# Patient Record
Sex: FEMALE | Race: WHITE | URBAN - METROPOLITAN AREA
[De-identification: names, ages, dates, MRNs, and addresses within clinical notes are randomized per-mention and may not be internally consistent; named-entity substitution may affect disease eponyms.]

---

## 2017-06-02 ENCOUNTER — TRANSFERRED RECORDS (OUTPATIENT)
Dept: HEALTH INFORMATION MANAGEMENT | Facility: CLINIC | Age: 52
End: 2017-06-02

## 2017-06-06 ENCOUNTER — ANESTHESIA EVENT (OUTPATIENT)
Dept: SURGERY | Facility: CLINIC | Age: 52
DRG: 166 | End: 2017-06-06
Payer: COMMERCIAL

## 2017-06-06 ENCOUNTER — ANESTHESIA (OUTPATIENT)
Dept: SURGERY | Facility: CLINIC | Age: 52
DRG: 166 | End: 2017-06-06
Payer: COMMERCIAL

## 2017-06-06 ENCOUNTER — HOSPITAL ENCOUNTER (INPATIENT)
Facility: CLINIC | Age: 52
LOS: 2 days | Discharge: HOME | DRG: 166 | End: 2017-06-09
Attending: THORACIC SURGERY (CARDIOTHORACIC VASCULAR SURGERY) | Admitting: THORACIC SURGERY (CARDIOTHORACIC VASCULAR SURGERY)
Payer: COMMERCIAL

## 2017-06-06 ENCOUNTER — APPOINTMENT (OUTPATIENT)
Dept: GENERAL RADIOLOGY | Facility: CLINIC | Age: 52
DRG: 166 | End: 2017-06-06
Attending: THORACIC SURGERY (CARDIOTHORACIC VASCULAR SURGERY)
Payer: COMMERCIAL

## 2017-06-06 DIAGNOSIS — L08.9 SOFT TISSUE INFECTION: Primary | ICD-10-CM

## 2017-06-06 DIAGNOSIS — Z79.4 TYPE 2 DIABETES MELLITUS WITHOUT COMPLICATION, WITH LONG-TERM CURRENT USE OF INSULIN (H): ICD-10-CM

## 2017-06-06 DIAGNOSIS — E11.9 TYPE 2 DIABETES MELLITUS WITHOUT COMPLICATION, WITH LONG-TERM CURRENT USE OF INSULIN (H): ICD-10-CM

## 2017-06-06 LAB
ABO + RH BLD: NORMAL
ABO + RH BLD: NORMAL
ANION GAP SERPL CALCULATED.3IONS-SCNC: 8 MMOL/L (ref 3–14)
BACTERIA SPEC CULT: NORMAL
BASE DEFICIT BLDA-SCNC: 5.2 MMOL/L
BLD GP AB SCN SERPL QL: NORMAL
BLOOD BANK CMNT PATIENT-IMP: NORMAL
BUN SERPL-MCNC: 22 MG/DL (ref 7–30)
CA-I BLD-MCNC: 4.3 MG/DL (ref 4.4–5.2)
CALCIUM SERPL-MCNC: 7.8 MG/DL (ref 8.5–10.1)
CHLORIDE SERPL-SCNC: 110 MMOL/L (ref 94–109)
CO2 SERPL-SCNC: 24 MMOL/L (ref 20–32)
CREAT SERPL-MCNC: 0.84 MG/DL (ref 0.52–1.04)
ERYTHROCYTE [DISTWIDTH] IN BLOOD BY AUTOMATED COUNT: 15.2 % (ref 10–15)
GFR SERPL CREATININE-BSD FRML MDRD: 70 ML/MIN/1.7M2
GLUCOSE BLD-MCNC: 192 MG/DL (ref 70–99)
GLUCOSE SERPL-MCNC: 184 MG/DL (ref 70–99)
HCO3 BLD-SCNC: 20 MMOL/L (ref 21–28)
HCT VFR BLD AUTO: 30.3 % (ref 35–47)
HGB BLD-MCNC: 9.6 G/DL (ref 11.7–15.7)
HGB BLD-MCNC: 9.9 G/DL (ref 11.7–15.7)
INR PPP: 1.29 (ref 0.86–1.14)
MAGNESIUM SERPL-MCNC: 2 MG/DL (ref 1.6–2.3)
MCH RBC QN AUTO: 27.7 PG (ref 26.5–33)
MCHC RBC AUTO-ENTMCNC: 32.7 G/DL (ref 31.5–36.5)
MCV RBC AUTO: 85 FL (ref 78–100)
MICRO REPORT STATUS: NORMAL
MRSA DNA SPEC QL NAA+PROBE: NORMAL
O2/TOTAL GAS SETTING VFR VENT: ABNORMAL %
PCO2 BLD: 40 MM HG (ref 35–45)
PH BLD: 7.32 PH (ref 7.35–7.45)
PHOSPHATE SERPL-MCNC: 3 MG/DL (ref 2.5–4.5)
PLATELET # BLD AUTO: 240 10E9/L (ref 150–450)
PO2 BLD: 91 MM HG (ref 80–105)
POTASSIUM BLD-SCNC: 3.9 MMOL/L (ref 3.4–5.3)
POTASSIUM SERPL-SCNC: 4.2 MMOL/L (ref 3.4–5.3)
RBC # BLD AUTO: 3.57 10E12/L (ref 3.8–5.2)
SODIUM BLD-SCNC: 140 MMOL/L (ref 133–144)
SODIUM SERPL-SCNC: 142 MMOL/L (ref 133–144)
SPECIMEN EXP DATE BLD: NORMAL
SPECIMEN SOURCE: NORMAL
SPECIMEN SOURCE: NORMAL
WBC # BLD AUTO: 20.5 10E9/L (ref 4–11)

## 2017-06-06 PROCEDURE — 84295 ASSAY OF SERUM SODIUM: CPT | Performed by: THORACIC SURGERY (CARDIOTHORACIC VASCULAR SURGERY)

## 2017-06-06 PROCEDURE — 85027 COMPLETE CBC AUTOMATED: CPT | Performed by: SURGERY

## 2017-06-06 PROCEDURE — C9399 UNCLASSIFIED DRUGS OR BIOLOG: HCPCS | Performed by: ANESTHESIOLOGY

## 2017-06-06 PROCEDURE — 82803 BLOOD GASES ANY COMBINATION: CPT | Performed by: THORACIC SURGERY (CARDIOTHORACIC VASCULAR SURGERY)

## 2017-06-06 PROCEDURE — 87640 STAPH A DNA AMP PROBE: CPT | Performed by: INTERNAL MEDICINE

## 2017-06-06 PROCEDURE — 0W9C00Z DRAINAGE OF MEDIASTINUM WITH DRAINAGE DEVICE, OPEN APPROACH: ICD-10-PCS | Performed by: THORACIC SURGERY (CARDIOTHORACIC VASCULAR SURGERY)

## 2017-06-06 PROCEDURE — 87070 CULTURE OTHR SPECIMN AEROBIC: CPT | Performed by: THORACIC SURGERY (CARDIOTHORACIC VASCULAR SURGERY)

## 2017-06-06 PROCEDURE — 82330 ASSAY OF CALCIUM: CPT | Performed by: THORACIC SURGERY (CARDIOTHORACIC VASCULAR SURGERY)

## 2017-06-06 PROCEDURE — 85610 PROTHROMBIN TIME: CPT | Performed by: SURGERY

## 2017-06-06 PROCEDURE — 37000009 ZZH ANESTHESIA TECHNICAL FEE, EACH ADDTL 15 MIN: Performed by: THORACIC SURGERY (CARDIOTHORACIC VASCULAR SURGERY)

## 2017-06-06 PROCEDURE — 0BJ08ZZ INSPECTION OF TRACHEOBRONCHIAL TREE, VIA NATURAL OR ARTIFICIAL OPENING ENDOSCOPIC: ICD-10-PCS | Performed by: THORACIC SURGERY (CARDIOTHORACIC VASCULAR SURGERY)

## 2017-06-06 PROCEDURE — 71000016 ZZH RECOVERY PHASE 1 LEVEL 3 FIRST HR: Performed by: THORACIC SURGERY (CARDIOTHORACIC VASCULAR SURGERY)

## 2017-06-06 PROCEDURE — 86850 RBC ANTIBODY SCREEN: CPT | Performed by: SURGERY

## 2017-06-06 PROCEDURE — 93010 ELECTROCARDIOGRAM REPORT: CPT | Performed by: INTERNAL MEDICINE

## 2017-06-06 PROCEDURE — 25000125 ZZHC RX 250: Performed by: ANESTHESIOLOGY

## 2017-06-06 PROCEDURE — 36000062 ZZH SURGERY LEVEL 4 1ST 30 MIN - UMMC: Performed by: THORACIC SURGERY (CARDIOTHORACIC VASCULAR SURGERY)

## 2017-06-06 PROCEDURE — 87102 FUNGUS ISOLATION CULTURE: CPT | Performed by: THORACIC SURGERY (CARDIOTHORACIC VASCULAR SURGERY)

## 2017-06-06 PROCEDURE — 36000064 ZZH SURGERY LEVEL 4 EA 15 ADDTL MIN - UMMC: Performed by: THORACIC SURGERY (CARDIOTHORACIC VASCULAR SURGERY)

## 2017-06-06 PROCEDURE — 87641 MR-STAPH DNA AMP PROBE: CPT | Performed by: INTERNAL MEDICINE

## 2017-06-06 PROCEDURE — 80048 BASIC METABOLIC PNL TOTAL CA: CPT | Performed by: SURGERY

## 2017-06-06 PROCEDURE — 27210794 ZZH OR GENERAL SUPPLY STERILE: Performed by: THORACIC SURGERY (CARDIOTHORACIC VASCULAR SURGERY)

## 2017-06-06 PROCEDURE — 83735 ASSAY OF MAGNESIUM: CPT | Performed by: SURGERY

## 2017-06-06 PROCEDURE — 84100 ASSAY OF PHOSPHORUS: CPT | Performed by: SURGERY

## 2017-06-06 PROCEDURE — 25000128 H RX IP 250 OP 636: Performed by: ANESTHESIOLOGY

## 2017-06-06 PROCEDURE — 87077 CULTURE AEROBIC IDENTIFY: CPT | Performed by: THORACIC SURGERY (CARDIOTHORACIC VASCULAR SURGERY)

## 2017-06-06 PROCEDURE — 37000008 ZZH ANESTHESIA TECHNICAL FEE, 1ST 30 MIN: Performed by: THORACIC SURGERY (CARDIOTHORACIC VASCULAR SURGERY)

## 2017-06-06 PROCEDURE — 87186 SC STD MICRODIL/AGAR DIL: CPT | Performed by: THORACIC SURGERY (CARDIOTHORACIC VASCULAR SURGERY)

## 2017-06-06 PROCEDURE — 86901 BLOOD TYPING SEROLOGIC RH(D): CPT | Performed by: SURGERY

## 2017-06-06 PROCEDURE — 84132 ASSAY OF SERUM POTASSIUM: CPT | Performed by: THORACIC SURGERY (CARDIOTHORACIC VASCULAR SURGERY)

## 2017-06-06 PROCEDURE — 40000065 ZZH STATISTIC EKG NON-CHARGEABLE

## 2017-06-06 PROCEDURE — 71000017 ZZH RECOVERY PHASE 1 LEVEL 3 EA ADDTL HR: Performed by: THORACIC SURGERY (CARDIOTHORACIC VASCULAR SURGERY)

## 2017-06-06 PROCEDURE — 71010 XR CHEST PORT 1 VW: CPT

## 2017-06-06 PROCEDURE — 82947 ASSAY GLUCOSE BLOOD QUANT: CPT | Performed by: THORACIC SURGERY (CARDIOTHORACIC VASCULAR SURGERY)

## 2017-06-06 PROCEDURE — 86900 BLOOD TYPING SEROLOGIC ABO: CPT | Performed by: SURGERY

## 2017-06-06 PROCEDURE — 25000566 ZZH SEVOFLURANE, EA 15 MIN: Performed by: THORACIC SURGERY (CARDIOTHORACIC VASCULAR SURGERY)

## 2017-06-06 RX ORDER — ONDANSETRON 2 MG/ML
INJECTION INTRAMUSCULAR; INTRAVENOUS PRN
Status: DISCONTINUED | OUTPATIENT
Start: 2017-06-06 | End: 2017-06-07

## 2017-06-06 RX ORDER — FENTANYL CITRATE 50 UG/ML
INJECTION, SOLUTION INTRAMUSCULAR; INTRAVENOUS PRN
Status: DISCONTINUED | OUTPATIENT
Start: 2017-06-06 | End: 2017-06-07

## 2017-06-06 RX ORDER — PROPOFOL 10 MG/ML
INJECTION, EMULSION INTRAVENOUS PRN
Status: DISCONTINUED | OUTPATIENT
Start: 2017-06-06 | End: 2017-06-07

## 2017-06-06 RX ORDER — CEFAZOLIN SODIUM 1 G/3ML
1 INJECTION, POWDER, FOR SOLUTION INTRAMUSCULAR; INTRAVENOUS SEE ADMIN INSTRUCTIONS
Status: DISCONTINUED | OUTPATIENT
Start: 2017-06-06 | End: 2017-06-07 | Stop reason: HOSPADM

## 2017-06-06 RX ORDER — LIDOCAINE HYDROCHLORIDE 20 MG/ML
INJECTION, SOLUTION INFILTRATION; PERINEURAL PRN
Status: DISCONTINUED | OUTPATIENT
Start: 2017-06-06 | End: 2017-06-07

## 2017-06-06 RX ORDER — CEFAZOLIN SODIUM 2 G/100ML
2 INJECTION, SOLUTION INTRAVENOUS
Status: DISCONTINUED | OUTPATIENT
Start: 2017-06-06 | End: 2017-06-07 | Stop reason: HOSPADM

## 2017-06-06 RX ORDER — SODIUM CHLORIDE, SODIUM LACTATE, POTASSIUM CHLORIDE, CALCIUM CHLORIDE 600; 310; 30; 20 MG/100ML; MG/100ML; MG/100ML; MG/100ML
INJECTION, SOLUTION INTRAVENOUS CONTINUOUS PRN
Status: DISCONTINUED | OUTPATIENT
Start: 2017-06-06 | End: 2017-06-07

## 2017-06-06 RX ORDER — ESMOLOL HYDROCHLORIDE 10 MG/ML
INJECTION INTRAVENOUS PRN
Status: DISCONTINUED | OUTPATIENT
Start: 2017-06-06 | End: 2017-06-07

## 2017-06-06 RX ORDER — DEXAMETHASONE SODIUM PHOSPHATE 4 MG/ML
INJECTION, SOLUTION INTRA-ARTICULAR; INTRALESIONAL; INTRAMUSCULAR; INTRAVENOUS; SOFT TISSUE PRN
Status: DISCONTINUED | OUTPATIENT
Start: 2017-06-06 | End: 2017-06-07

## 2017-06-06 RX ORDER — PROPOFOL 10 MG/ML
INJECTION, EMULSION INTRAVENOUS CONTINUOUS PRN
Status: DISCONTINUED | OUTPATIENT
Start: 2017-06-06 | End: 2017-06-07

## 2017-06-06 RX ORDER — EPHEDRINE SULFATE 50 MG/ML
INJECTION, SOLUTION INTRAMUSCULAR; INTRAVENOUS; SUBCUTANEOUS PRN
Status: DISCONTINUED | OUTPATIENT
Start: 2017-06-06 | End: 2017-06-07

## 2017-06-06 RX ADMIN — ROCURONIUM BROMIDE 20 MG: 10 INJECTION INTRAVENOUS at 22:32

## 2017-06-06 RX ADMIN — FENTANYL CITRATE 50 MCG: 50 INJECTION, SOLUTION INTRAMUSCULAR; INTRAVENOUS at 22:54

## 2017-06-06 RX ADMIN — SODIUM CHLORIDE, POTASSIUM CHLORIDE, SODIUM LACTATE AND CALCIUM CHLORIDE: 600; 310; 30; 20 INJECTION, SOLUTION INTRAVENOUS at 21:37

## 2017-06-06 RX ADMIN — ESMOLOL HYDROCHLORIDE 20 MG: 10 INJECTION, SOLUTION INTRAVENOUS at 23:36

## 2017-06-06 RX ADMIN — FENTANYL CITRATE 100 MCG: 50 INJECTION, SOLUTION INTRAMUSCULAR; INTRAVENOUS at 21:50

## 2017-06-06 RX ADMIN — PROPOFOL 150 MG: 10 INJECTION, EMULSION INTRAVENOUS at 21:50

## 2017-06-06 RX ADMIN — ONDANSETRON 4 MG: 2 INJECTION INTRAMUSCULAR; INTRAVENOUS at 23:27

## 2017-06-06 RX ADMIN — SUGAMMADEX 200 MG: 100 INJECTION, SOLUTION INTRAVENOUS at 23:26

## 2017-06-06 RX ADMIN — LIDOCAINE HYDROCHLORIDE 100 MG: 20 INJECTION, SOLUTION INFILTRATION; PERINEURAL at 21:50

## 2017-06-06 RX ADMIN — SUCCINYLCHOLINE CHLORIDE 100 MG: 20 INJECTION, SOLUTION INTRAMUSCULAR; INTRAVENOUS at 21:50

## 2017-06-06 RX ADMIN — PHENYLEPHRINE HYDROCHLORIDE 200 MCG: 10 INJECTION, SOLUTION INTRAMUSCULAR; INTRAVENOUS; SUBCUTANEOUS at 21:59

## 2017-06-06 RX ADMIN — PROPOFOL 100 MCG/KG/MIN: 10 INJECTION, EMULSION INTRAVENOUS at 22:00

## 2017-06-06 RX ADMIN — Medication 10 MG: at 22:03

## 2017-06-06 RX ADMIN — PHENYLEPHRINE HYDROCHLORIDE 100 MCG: 10 INJECTION, SOLUTION INTRAMUSCULAR; INTRAVENOUS; SUBCUTANEOUS at 21:53

## 2017-06-06 RX ADMIN — FENTANYL CITRATE 50 MCG: 50 INJECTION, SOLUTION INTRAMUSCULAR; INTRAVENOUS at 22:33

## 2017-06-06 RX ADMIN — ROCURONIUM BROMIDE 50 MG: 10 INJECTION INTRAVENOUS at 22:11

## 2017-06-06 RX ADMIN — FENTANYL CITRATE 50 MCG: 50 INJECTION, SOLUTION INTRAMUSCULAR; INTRAVENOUS at 23:08

## 2017-06-06 RX ADMIN — ESMOLOL HYDROCHLORIDE 20 MG: 10 INJECTION, SOLUTION INTRAVENOUS at 23:38

## 2017-06-06 RX ADMIN — ESMOLOL HYDROCHLORIDE 20 MG: 10 INJECTION, SOLUTION INTRAVENOUS at 22:38

## 2017-06-06 RX ADMIN — DEXAMETHASONE SODIUM PHOSPHATE 10 MG: 4 INJECTION, SOLUTION INTRA-ARTICULAR; INTRALESIONAL; INTRAMUSCULAR; INTRAVENOUS; SOFT TISSUE at 22:41

## 2017-06-06 NOTE — IP AVS SNAPSHOT
[address] [address]    Phone:  [phone]                                       After Visit Summary   6/6/2017    Otilia Mckee    MRN: [id number]           After Visit Summary Signature Page     I have received my discharge instructions, and my questions have been answered. I have discussed any challenges I see with this plan with the nurse or doctor.    ..........................................................................................................................................  Patient/Patient Representative Signature      ..........................................................................................................................................  Patient Representative Print Name and Relationship to Patient    ..................................................               ................................................  Date                                            Time    ..........................................................................................................................................  Reviewed by Signature/Title    ...................................................              ..............................................  Date                                                            Time

## 2017-06-06 NOTE — IP AVS SNAPSHOT
MRN:[id number]                      After Visit Summary   6/6/2017    Otilia Mckee    MRN: [id number]           Thank you!     Thank you for choosing [facility] for your care. Our goal is always to provide you with excellent care. Hearing back from our patients is one way we can continue to improve our services. Please take a few minutes to complete the written survey that you may receive in the mail after you visit with us. Thank you!        Patient Information     Date Of Birth          1965        Designated Caregiver       Most Recent Value    Caregiver    Will someone help with your care after discharge? yes    Name of designated caregiver Carlos Mckee    Phone number of caregiver [phone]    Caregiver address [place]      About your hospital stay     You were admitted on:  June 6, 2017 You last received care in the:  [address] [facility]    You were discharged on:  June 9, 2017        Reason for your hospital stay       Mediastinal abscess                  Who to Call     For medical emergencies, please call 911.  For non-urgent questions about your medical care, please call your primary care provider or clinic, None  For questions related to your surgery, please call your surgery clinic        Attending Provider     Provider Specialty    Jay Burgos MD Pulmonary    SepulvedaJacky MD Thoracic Diseases       Primary Care Provider    Physician No Ref-Primary      After Care Instructions     Discharge Instructions       THORACIC SURGERY DISCHARGE INSTRUCTIONS    ACTIVITY  If your plans upon discharge include prolonged periods of sitting (i.e a lengthy car or plane ride), it is highly beneficial to get up and walk at least once per hour to help prevent swelling and blood clots.     Activity as tolerated, no strenous activity.      DRESSINGS/INCISIONS  Daily dressing changes to be performed by your  as instructed in the hospital. You should moisten gauze with normal  "saline and gently back into the wound. Cover this and the penrose drain with 4x4s and tape.     OTHER INSTRUCTIONS  Take incentive spirometer home for continued frequent use    Stay hydrated. Take over the counter fiber (metamucil or benefiber) and stool softeners (Miralax, docusate or senna) if becoming constipated. You have also been prescribed a stool softener that you should take while taking narcotics.    No driving while taking narcotic pain medication.    Transition to ibuprofen or tylenol/acetaminophen for pain control. Do not take tylenol/acetaminophen and acetaminophen containing narcotic (e.g., percocet or vicodin) at the same time. If you have known ulcer problems, or kidney trouble (elevated creatinine) do not take the ibuprofen.      WHEN TO CALL    Call the numbers below for fever greater than 101.5, chills, increased size of incision, red skin around incision, vision changes, muscle strength changes, sensation changes, shortness of breath, or other concerns.    In emergencies, call 911    For other Questions or Concerns;   A.) During weekday working hours (Monday through Friday 8am to 4:30pm)   call [phone] and ask to speak to a clinical nurse specialist.     B.) At nights (after 4:30pm), on weekends, or if urgent call [phone] and   tell the  \"I would like to page job code [phone], the thoracic surgery   fellow on call, please.\"    FOLLOW UP  Follow up in clinic with Dr. Sepulveda on 6/14, with a CT scan. Please call if you have not been reached to schedule this appointment.  You should follow up with your primary care physician and endocrinologist when you return home to discuss your recent hospitalization and medication changes.            Supplies       List the supplies the pt needs to go home:  -Small kerlix rolls  -4x4 gauze  -Sterile saline flushes/bottle  -Medipore tape  -Q-tips for packing  -Scissors            Wound care and dressings       Instructions to care for your " wound at home:   -Perform dressing changes daily  -If desired, can take tylenol or oxycodone prior to dressing change. Can shower and get dressing wet, to make it easier to remove.   1) Remove the tape  2) Remove old dressing  3) Using clean gloves, moisten kerlix with sterile irrigation  4) Wring the kerlix out so it is moist, but not dripping wet  5) Gently pack the wound using a q-tip to pack the kerlix gauze gently to the base of the wound. Continue to pack the entire wound.   6) Cover wound packing with 4x4 gauze, make sure the penrose drain is also covered  7) Tape the dressing down.   Call with any questions or concerns.                  Follow-up Appointments     Adult [facility] Follow-up and recommended labs and tests       Follow up on Wednesday 6/14 in clinic with Dr. Sepulveda. You should undergo a CT scan prior to the appointment.     Appointments on [place] and/or [facility] (with [facility] or [facility] provider or service). Call [phone] if you haven't heard regarding these appointments within 7 days of discharge.                  Your next 10 appointments already scheduled     Jun 14, 2017  7:20 AM CDT   (Arrive by 7:05 AM)   CT CHEST W/O CONTRAST with UCCT1   [facility] CT ([facility])    [address] [address] [phone]           Please bring any scans or X-rays taken at other hospitals, if similar tests were done. Also bring a list of your medicines, including vitamins, minerals and over-the-counter drugs. It is safest to leave personal items at home.  Be sure to tell your doctor:   If you have any allergies.   If there s any chance you are pregnant.   If you are breastfeeding.   If you have any special needs.  You do not need to do anything special to prepare.  Please wear loose clothing, such as a sweat suit or jogging clothes. Avoid snaps, zippers and other metal. We may ask you to undress and put on a hospital gown.             Jun 14, 2017  7:40 AM CDT   (Arrive by 7:25 AM)   CT SOFT TISSUE NECK W/O CONTRAST with UCCT1   [facility] CT ([facility])    [address] [place] [id number] [phone]           Please bring any scans or X-rays taken at other hospitals, if similar tests were done. Also bring a list of your medicines, including vitamins, minerals and over-the-counter drugs. It is safest to leave personal items at home.  Be sure to tell your doctor:   If you have any allergies.   If there s any chance you are pregnant.   If you are breastfeeding.   If you have any special needs.  You do not need to do anything special to prepare.  Please wear loose clothing, such as a sweat suit or jogging clothes. Avoid snaps, zippers and other metal. We may ask you to undress and put on a hospital gown.            Jun 14, 2017 12:30 PM CDT   (Arrive by 12:15 PM)   New Patient Visit with Jacky Sepulveda MD   [facility] ([facility])    [address] [place] [id number] [phone]              Future tests that were ordered for you     CT Chest w/o contrast       Administration of IV contrast (contrast agent, dose, and amount) will be tailored to this examination per the appropriate written protocol listed in the Protocol E-Book, or by the supervising imaging provider.            CT Soft tissue neck w/o contrast       Administration of IV contrast (contrast agent, dose, and amount) will be tailored to this examination per the appropriate written protocol listed in the Protocol E-Book, or by the supervising imaging provider.                  Further instructions from your care team       Diabetes Plan for Discharge:    lantus 22 units in the morning   Novolog 3 units with each meal ( if not eating, do not give)  Plus use correction scale ( if not eating, just use correction scale)    Do Not  give Correction Insulin if Pre-Meal BG less than 140 before meals, before meals   1 per 35 >/=140   -174 = 1 unit.   -209 = 2 units.   -244 = 3 units.   -279 = 4 units.   -314 = 5 units.   -349 = 6 units.   -384 = 7 units.   -419 = 8 units.   BG >/=420 = 9 units.     Correction Scale - custom DOSING   Bedtime   Do Not give Bedtime Correction Insulin if BG less than 200   -234 = 1 unit   -269 = 2 units.   -304 = 3 units.   -339 = 4 units.   -374 = 5 units.   BG >/=375 = 6 units.       Test  glucose before meals, HS and 0200( if awake)    Start Metformin 500 mg with dinner ( may increase dose to twice daily in one week, if tolerating medication)   -goal blood sugars are , ok if occasional glucose of 250 or less  Call if blood sugars are 70 or less or > 250 consistantly    Call the diabetes management team with questions regarding your treatment plan after discharge.    Britney Delatorre PA-C [phone] (Friday - Monday, 8-5)  Kya Kaufman [phone] ( Monday-Friday, 8-5)   or the on-call endocrinologist can be paged by the hospital  [phone].    Additional Information     If you use hormonal birth control (such as the pill, patch, ring or implants): You'll need a second form of birth control for 7 days (condoms, a diaphragm or contraceptive foam). While in the hospital, you received a medicine called Bridion. Your normal birth control will not work as well for a week after taking this medicine.          Pending Results     Date and Time Order Name Status Description    6/8/2017 0232 EKG 12-lead, complete Preliminary     6/6/2017 2318 Fungus Culture, non-blood Preliminary     6/6/2017 2318 Abscess Culture Aerobic Bacterial Preliminary             Statement of Approval     Ordered          06/09/17 1357  I have reviewed and agree with all the recommendations and orders detailed in this document.  EFFECTIVE NOW     Approved  "and electronically signed by:  Winnie Arriola MD           17 1337  I have reviewed and agree with all the recommendations and orders detailed in this document.  EFFECTIVE NOW     Approved and electronically signed by:  Winnie Arriola MD             Admission Information     Date & Time Provider Department Dept. Phone    2017 Jacky Sepulveda MD [address] [facility] [phone]      Your Vitals Were     Blood Pressure Temperature Respirations Height Weight Pulse Oximetry    140/65 (BP Location: Left arm) 97.9  F (36.6  C) (Oral) 18 1.651 m (5' 5\") 97.8 kg (215 lb 11.2 oz) 95%    BMI (Body Mass Index)                   35.89 kg/m2           [facility] Information     [employer] lets you send messages to your doctor, view your test results, renew your prescriptions, schedule appointments and more. To sign up, go to [email or web address] . Click on \"Log in\" on the left side of the screen, which will take you to the Welcome page. Then click on \"Sign up Now\" on the right side of the page.     You will be asked to enter the access code listed below, as well as some personal information. Please follow the directions to create your username and password.     Your access code is: [email or web address]  Expires: 2017 12:15 PM     Your access code will  in 90 days. If you need help or a new code, please call your [facility] clinic or [phone].        Care EveryWhere ID     This is your Care EveryWhere ID. This could be used by other organizations to access your [facility] medical records  [id number]           Review of your medicines      START taking        Dose / Directions    acetaminophen 32 mg/mL solution   Commonly known as:  TYLENOL   Used for:  Soft tissue infection        Dose:  650 mg   Take 20.3 mLs (650 mg) by mouth every 4 hours as needed for mild pain or fever   Quantity:  473 mL   Refills:  0       clindamycin 300 MG capsule   Commonly known as:  CLEOCIN   Indication:  Skin and Soft " Tissue Infection   Used for:  Soft tissue infection        Dose:  600 mg   Take 2 capsules (600 mg) by mouth every 8 hours for 10 days   Quantity:  60 capsule   Refills:  0       * insulin aspart 100 UNIT/ML injection   Commonly known as:  NovoLOG PEN   Used for:  Type 2 diabetes mellitus without complication, with long-term current use of insulin (H)   Replaces:  INSULIN PUMP - OUTPATIENT        Dose:  1-9 Units   Inject 1-9 Units Subcutaneous 3 times daily (before meals) Correction Scale Do Not give Correction Insulin if Pre-Meal BG less than 140  1 per 35 >/=140 -174 = 1 unit. -209 = 2 units. -244 = 3 units. -279 = 4 units. -314 = 5 units. -349 = 6 units. -384 = 7 units. -419 = 8 units. BG >/=420 = 9 units.   Quantity:  500 mL   Refills:  0       * insulin aspart 100 UNIT/ML injection   Commonly known as:  NovoLOG PEN   Used for:  Type 2 diabetes mellitus without complication, with long-term current use of insulin (H)        Dose:  1-6 Units   Inject 1-6 Units Subcutaneous At Bedtime   Quantity:  200 mL   Refills:  0       * insulin aspart 100 UNIT/ML injection   Commonly known as:  NovoLOG PEN   Used for:  Type 2 diabetes mellitus without complication, with long-term current use of insulin (H)        Dose:  1-9 Units   Inject 1-9 Units Subcutaneous 3 times daily (with meals) DOSE:  1 units per 10 grams of carbohydrate.  Only chart total amount of units given.  Do not give if pre-prandial glucose is less than 60 mg/dL.   Quantity:  200 mL   Refills:  0       oxyCODONE 5 MG/5ML solution   Commonly known as:  ROXICODONE   Used for:  Soft tissue infection        Dose:  5 mg   Take 5 mLs (5 mg) by mouth 4 times daily as needed for moderate to severe pain   Quantity:  100 mL   Refills:  0       polyethylene glycol Packet   Commonly known as:  MIRALAX/GLYCOLAX   Used for:  Soft tissue infection        Dose:  17 g   Take 17 g by mouth daily   Quantity:  7 packet   Refills:   0       * Notice:  This list has 3 medication(s) that are the same as other medications prescribed for you. Read the directions carefully, and ask your doctor or other care provider to review them with you.      CONTINUE these medicines which may have CHANGED, or have new prescriptions. If we are uncertain of the size of tablets/capsules you have at home, strength may be listed as something that might have changed.        Dose / Directions    insulin glargine 100 UNIT/ML injection   Commonly known as:  LANTUS   This may have changed:    - how much to take  - when to take this   Used for:  Type 2 diabetes mellitus without complication, with long-term current use of insulin (H)        Dose:  22 Units   Inject 22 Units Subcutaneous every morning   Quantity:  6.6 mL   Refills:  3       metFORMIN 500 MG tablet   Commonly known as:  GLUCOPHAGE   This may have changed:    - how much to take  - when to take this   Used for:  Type 2 diabetes mellitus without complication, with long-term current use of insulin (H)        Dose:  500 mg   Take 1 tablet (500 mg) by mouth daily (with dinner)   Quantity:  60 tablet   Refills:  0         CONTINUE these medicines which have NOT CHANGED        Dose / Directions    ASPIRIN PO        Dose:  81 mg   Take 81 mg by mouth daily   Refills:  0       BUSPIRONE HCL PO        Dose:  7.5 mg   Take 7.5 mg by mouth 2 times daily   Refills:  0       CLOPIDOGREL BISULFATE PO        Dose:  75 mg   Take 75 mg by mouth daily   Refills:  0       DULOXETINE HCL PO        Dose:  60 mg   Take 60 mg by mouth At Bedtime   Refills:  0       GABAPENTIN PO        Dose:  600 mg   Take 600 mg by mouth 3 times daily   Refills:  0       isosorbide mononitrate 30 MG 24 hr tablet   Commonly known as:  IMDUR        Dose:  30 mg   Take 30 mg by mouth daily   Refills:  0       loratadine 10 MG tablet   Commonly known as:  CLARITIN        Dose:  10 mg   Take 10 mg by mouth daily   Refills:  0       NITROSTAT SL         Dose:  0.4 mg   Place 0.4 mg under the tongue every 5 minutes as needed for chest pain   Refills:  0       PANTOPRAZOLE SODIUM PO        Dose:  40 mg   Take 40 mg by mouth daily   Refills:  0       ROSUVASTATIN CALCIUM PO        Dose:  20 mg   Take 20 mg by mouth daily   Refills:  0       TOPIRAMATE PO   Indication:  Migraine Headache        Dose:  25 mg   Take 25 mg by mouth daily   Refills:  0         STOP taking     INSULIN PUMP - OUTPATIENT   Replaced by:  insulin aspart 100 UNIT/ML injection                Where to get your medicines      These medications were sent to [facility] - [place], [place] - [address]  [address] [address]     Phone:  [phone]     acetaminophen 32 mg/mL solution    clindamycin 300 MG capsule    insulin aspart 100 UNIT/ML injection    insulin glargine 100 UNIT/ML injection    metFORMIN 500 MG tablet    polyethylene glycol Packet         Some of these will need a paper prescription and others can be bought over the counter. Ask your nurse if you have questions.     Bring a paper prescription for each of these medications     insulin aspart 100 UNIT/ML injection    insulin aspart 100 UNIT/ML injection    oxyCODONE 5 MG/5ML solution                Protect others around you: Learn how to safely use, store and throw away your medicines at www.disposemymeds.org.             Medication List: This is a list of all your medications and when to take them. Check marks below indicate your daily home schedule. Keep this list as a reference.      Medications           Morning Afternoon Evening Bedtime As Needed    acetaminophen 32 mg/mL solution   Commonly known as:  TYLENOL   Take 20.3 mLs (650 mg) by mouth every 4 hours as needed for mild pain or fever                                ASPIRIN PO   Take 81 mg by mouth daily   Last time this was given:  81 mg on 6/9/2017  8:40 AM                                BUSPIRONE HCL PO   Take 7.5 mg by mouth 2  times daily   Last time this was given:  7.5 mg on 6/9/2017  8:39 AM                                clindamycin 300 MG capsule   Commonly known as:  CLEOCIN   Take 2 capsules (600 mg) by mouth every 8 hours for 10 days                                CLOPIDOGREL BISULFATE PO   Take 75 mg by mouth daily   Last time this was given:  75 mg on 6/9/2017  8:41 AM                                DULOXETINE HCL PO   Take 60 mg by mouth At Bedtime   Last time this was given:  60 mg on 6/8/2017 10:01 PM                                GABAPENTIN PO   Take 600 mg by mouth 3 times daily   Last time this was given:  600 mg on 6/9/2017  8:40 AM                                * insulin aspart 100 UNIT/ML injection   Commonly known as:  NovoLOG PEN   Inject 1-9 Units Subcutaneous 3 times daily (before meals) Correction Scale Do Not give Correction Insulin if Pre-Meal BG less than 140  1 per 35 >/=140 -174 = 1 unit. -209 = 2 units. -244 = 3 units. -279 = 4 units. -314 = 5 units. -349 = 6 units. -384 = 7 units. -419 = 8 units. BG >/=420 = 9 units.   Last time this was given:  5 Units on 6/9/2017  1:46 PM                                * insulin aspart 100 UNIT/ML injection   Commonly known as:  NovoLOG PEN   Inject 1-6 Units Subcutaneous At Bedtime   Last time this was given:  5 Units on 6/9/2017  1:46 PM                                * insulin aspart 100 UNIT/ML injection   Commonly known as:  NovoLOG PEN   Inject 1-9 Units Subcutaneous 3 times daily (with meals) DOSE:  1 units per 10 grams of carbohydrate.  Only chart total amount of units given.  Do not give if pre-prandial glucose is less than 60 mg/dL.   Last time this was given:  5 Units on 6/9/2017  1:46 PM                                insulin glargine 100 UNIT/ML injection   Commonly known as:  LANTUS   Inject 22 Units Subcutaneous every morning   Last time this was given:  22 Units on 6/9/2017  8:39 AM                                 isosorbide mononitrate 30 MG 24 hr tablet   Commonly known as:  IMDUR   Take 30 mg by mouth daily   Last time this was given:  30 mg on 6/9/2017  8:41 AM                                loratadine 10 MG tablet   Commonly known as:  CLARITIN   Take 10 mg by mouth daily                                metFORMIN 500 MG tablet   Commonly known as:  GLUCOPHAGE   Take 1 tablet (500 mg) by mouth daily (with dinner)                                NITROSTAT SL   Place 0.4 mg under the tongue every 5 minutes as needed for chest pain                                oxyCODONE 5 MG/5ML solution   Commonly known as:  ROXICODONE   Take 5 mLs (5 mg) by mouth 4 times daily as needed for moderate to severe pain                                PANTOPRAZOLE SODIUM PO   Take 40 mg by mouth daily   Last time this was given:  40 mg on 6/9/2017  8:41 AM                                polyethylene glycol Packet   Commonly known as:  MIRALAX/GLYCOLAX   Take 17 g by mouth daily                                ROSUVASTATIN CALCIUM PO   Take 20 mg by mouth daily                                TOPIRAMATE PO   Take 25 mg by mouth daily   Last time this was given:  25 mg on 6/9/2017  8:51 AM                                * Notice:  This list has 3 medication(s) that are the same as other medications prescribed for you. Read the directions carefully, and ask your doctor or other care provider to review them with you.

## 2017-06-07 ENCOUNTER — APPOINTMENT (OUTPATIENT)
Dept: SPEECH THERAPY | Facility: CLINIC | Age: 52
DRG: 166 | End: 2017-06-07
Attending: THORACIC SURGERY (CARDIOTHORACIC VASCULAR SURGERY)
Payer: COMMERCIAL

## 2017-06-07 PROBLEM — Z78.9 ADMITTED TO INTENSIVE CARE UNIT: Status: ACTIVE | Noted: 2017-06-07

## 2017-06-07 LAB
ANION GAP SERPL CALCULATED.3IONS-SCNC: 9 MMOL/L (ref 3–14)
BUN SERPL-MCNC: 23 MG/DL (ref 7–30)
CALCIUM SERPL-MCNC: 8.1 MG/DL (ref 8.5–10.1)
CHLORIDE SERPL-SCNC: 110 MMOL/L (ref 94–109)
CO2 SERPL-SCNC: 24 MMOL/L (ref 20–32)
CREAT SERPL-MCNC: 0.74 MG/DL (ref 0.52–1.04)
CRP SERPL-MCNC: 270 MG/L (ref 0–8)
ERYTHROCYTE [DISTWIDTH] IN BLOOD BY AUTOMATED COUNT: 15.3 % (ref 10–15)
GFR SERPL CREATININE-BSD FRML MDRD: 82 ML/MIN/1.7M2
GLUCOSE BLDC GLUCOMTR-MCNC: 207 MG/DL (ref 70–99)
GLUCOSE BLDC GLUCOMTR-MCNC: 215 MG/DL (ref 70–99)
GLUCOSE BLDC GLUCOMTR-MCNC: 231 MG/DL (ref 70–99)
GLUCOSE BLDC GLUCOMTR-MCNC: 273 MG/DL (ref 70–99)
GLUCOSE BLDC GLUCOMTR-MCNC: 294 MG/DL (ref 70–99)
GLUCOSE BLDC GLUCOMTR-MCNC: 307 MG/DL (ref 70–99)
GLUCOSE SERPL-MCNC: 246 MG/DL (ref 70–99)
HCT VFR BLD AUTO: 31.1 % (ref 35–47)
HGB BLD-MCNC: 10.1 G/DL (ref 11.7–15.7)
INTERPRETATION ECG - MUSE: NORMAL
INTERPRETATION ECG - MUSE: NORMAL
MAGNESIUM SERPL-MCNC: 2.1 MG/DL (ref 1.6–2.3)
MCH RBC QN AUTO: 27.7 PG (ref 26.5–33)
MCHC RBC AUTO-ENTMCNC: 32.5 G/DL (ref 31.5–36.5)
MCV RBC AUTO: 85 FL (ref 78–100)
PHOSPHATE SERPL-MCNC: 2.8 MG/DL (ref 2.5–4.5)
PLATELET # BLD AUTO: 251 10E9/L (ref 150–450)
POTASSIUM SERPL-SCNC: 4.4 MMOL/L (ref 3.4–5.3)
RBC # BLD AUTO: 3.65 10E12/L (ref 3.8–5.2)
SODIUM SERPL-SCNC: 143 MMOL/L (ref 133–144)
WBC # BLD AUTO: 22.3 10E9/L (ref 4–11)

## 2017-06-07 PROCEDURE — 25000132 ZZH RX MED GY IP 250 OP 250 PS 637: Performed by: STUDENT IN AN ORGANIZED HEALTH CARE EDUCATION/TRAINING PROGRAM

## 2017-06-07 PROCEDURE — 93010 ELECTROCARDIOGRAM REPORT: CPT | Performed by: INTERNAL MEDICINE

## 2017-06-07 PROCEDURE — 00000146 ZZHCL STATISTIC GLUCOSE BY METER IP

## 2017-06-07 PROCEDURE — 25000128 H RX IP 250 OP 636: Performed by: STUDENT IN AN ORGANIZED HEALTH CARE EDUCATION/TRAINING PROGRAM

## 2017-06-07 PROCEDURE — 93005 ELECTROCARDIOGRAM TRACING: CPT

## 2017-06-07 PROCEDURE — 86140 C-REACTIVE PROTEIN: CPT | Performed by: STUDENT IN AN ORGANIZED HEALTH CARE EDUCATION/TRAINING PROGRAM

## 2017-06-07 PROCEDURE — 83735 ASSAY OF MAGNESIUM: CPT | Performed by: STUDENT IN AN ORGANIZED HEALTH CARE EDUCATION/TRAINING PROGRAM

## 2017-06-07 PROCEDURE — 27210995 ZZH RX 272: Performed by: STUDENT IN AN ORGANIZED HEALTH CARE EDUCATION/TRAINING PROGRAM

## 2017-06-07 PROCEDURE — 85027 COMPLETE CBC AUTOMATED: CPT | Performed by: STUDENT IN AN ORGANIZED HEALTH CARE EDUCATION/TRAINING PROGRAM

## 2017-06-07 PROCEDURE — 25000125 ZZHC RX 250: Performed by: PHYSICIAN ASSISTANT

## 2017-06-07 PROCEDURE — 25800025 ZZH RX 258: Performed by: SURGERY

## 2017-06-07 PROCEDURE — 92610 EVALUATE SWALLOWING FUNCTION: CPT | Mod: GN | Performed by: SPEECH-LANGUAGE PATHOLOGIST

## 2017-06-07 PROCEDURE — 25000128 H RX IP 250 OP 636: Performed by: ANESTHESIOLOGY

## 2017-06-07 PROCEDURE — 84100 ASSAY OF PHOSPHORUS: CPT | Performed by: STUDENT IN AN ORGANIZED HEALTH CARE EDUCATION/TRAINING PROGRAM

## 2017-06-07 PROCEDURE — 25000131 ZZH RX MED GY IP 250 OP 636 PS 637: Performed by: PHYSICIAN ASSISTANT

## 2017-06-07 PROCEDURE — 25000128 H RX IP 250 OP 636: Performed by: SURGERY

## 2017-06-07 PROCEDURE — 40000225 ZZH STATISTIC SLP WARD VISIT: Performed by: SPEECH-LANGUAGE PATHOLOGIST

## 2017-06-07 PROCEDURE — 27210437 ZZH NUTRITION PRODUCT SEMIELEM INTERMED LITER

## 2017-06-07 PROCEDURE — 25000125 ZZHC RX 250: Performed by: SURGERY

## 2017-06-07 PROCEDURE — 25000128 H RX IP 250 OP 636: Performed by: THORACIC SURGERY (CARDIOTHORACIC VASCULAR SURGERY)

## 2017-06-07 PROCEDURE — 25000131 ZZH RX MED GY IP 250 OP 636 PS 637: Performed by: STUDENT IN AN ORGANIZED HEALTH CARE EDUCATION/TRAINING PROGRAM

## 2017-06-07 PROCEDURE — 80048 BASIC METABOLIC PNL TOTAL CA: CPT | Performed by: STUDENT IN AN ORGANIZED HEALTH CARE EDUCATION/TRAINING PROGRAM

## 2017-06-07 PROCEDURE — 25000125 ZZHC RX 250: Performed by: ANESTHESIOLOGY

## 2017-06-07 PROCEDURE — 12000006 ZZH R&B IMCU INTERMEDIATE UMMC

## 2017-06-07 RX ORDER — SODIUM CHLORIDE 450 MG/100ML
INJECTION, SOLUTION INTRAVENOUS CONTINUOUS
Status: DISCONTINUED | OUTPATIENT
Start: 2017-06-07 | End: 2017-06-09

## 2017-06-07 RX ORDER — POTASSIUM CHLORIDE 7.45 MG/ML
10 INJECTION INTRAVENOUS
Status: DISCONTINUED | OUTPATIENT
Start: 2017-06-07 | End: 2017-06-09

## 2017-06-07 RX ORDER — AMPICILLIN AND SULBACTAM 2; 1 G/1; G/1
3 INJECTION, POWDER, FOR SOLUTION INTRAMUSCULAR; INTRAVENOUS EVERY 6 HOURS
Status: DISCONTINUED | OUTPATIENT
Start: 2017-06-07 | End: 2017-06-09

## 2017-06-07 RX ORDER — ONDANSETRON 4 MG/1
4 TABLET, ORALLY DISINTEGRATING ORAL EVERY 30 MIN PRN
Status: DISCONTINUED | OUTPATIENT
Start: 2017-06-07 | End: 2017-06-07 | Stop reason: HOSPADM

## 2017-06-07 RX ORDER — POTASSIUM CHLORIDE 29.8 MG/ML
20 INJECTION INTRAVENOUS
Status: DISCONTINUED | OUTPATIENT
Start: 2017-06-07 | End: 2017-06-09

## 2017-06-07 RX ORDER — BISACODYL 10 MG
10 SUPPOSITORY, RECTAL RECTAL DAILY PRN
Status: ON HOLD | COMMUNITY
End: 2017-06-08

## 2017-06-07 RX ORDER — DEXTROSE MONOHYDRATE 25 G/50ML
25-50 INJECTION, SOLUTION INTRAVENOUS
Status: DISCONTINUED | OUTPATIENT
Start: 2017-06-07 | End: 2017-06-09 | Stop reason: HOSPADM

## 2017-06-07 RX ORDER — NALOXONE HYDROCHLORIDE 0.4 MG/ML
.1-.4 INJECTION, SOLUTION INTRAMUSCULAR; INTRAVENOUS; SUBCUTANEOUS
Status: DISCONTINUED | OUTPATIENT
Start: 2017-06-07 | End: 2017-06-09 | Stop reason: HOSPADM

## 2017-06-07 RX ORDER — ASPIRIN 300 MG/1
300 SUPPOSITORY RECTAL DAILY
Status: DISCONTINUED | OUTPATIENT
Start: 2017-06-07 | End: 2017-06-08

## 2017-06-07 RX ORDER — MORPHINE SULFATE 2 MG/ML
2-4 INJECTION, SOLUTION INTRAMUSCULAR; INTRAVENOUS
Status: DISCONTINUED | OUTPATIENT
Start: 2017-06-07 | End: 2017-06-07

## 2017-06-07 RX ORDER — SODIUM CHLORIDE, SODIUM LACTATE, POTASSIUM CHLORIDE, CALCIUM CHLORIDE 600; 310; 30; 20 MG/100ML; MG/100ML; MG/100ML; MG/100ML
INJECTION, SOLUTION INTRAVENOUS CONTINUOUS
Status: DISCONTINUED | OUTPATIENT
Start: 2017-06-07 | End: 2017-06-07

## 2017-06-07 RX ORDER — FLUCONAZOLE 2 MG/ML
200 INJECTION, SOLUTION INTRAVENOUS EVERY 24 HOURS
Status: DISCONTINUED | OUTPATIENT
Start: 2017-06-07 | End: 2017-06-09

## 2017-06-07 RX ORDER — ASPIRIN 325 MG
325 TABLET ORAL DAILY
Status: DISCONTINUED | OUTPATIENT
Start: 2017-06-07 | End: 2017-06-07

## 2017-06-07 RX ORDER — SODIUM CHLORIDE, SODIUM LACTATE, POTASSIUM CHLORIDE, CALCIUM CHLORIDE 600; 310; 30; 20 MG/100ML; MG/100ML; MG/100ML; MG/100ML
INJECTION, SOLUTION INTRAVENOUS CONTINUOUS
Status: DISCONTINUED | OUTPATIENT
Start: 2017-06-07 | End: 2017-06-07 | Stop reason: HOSPADM

## 2017-06-07 RX ORDER — NALOXONE HYDROCHLORIDE 0.4 MG/ML
.1-.4 INJECTION, SOLUTION INTRAMUSCULAR; INTRAVENOUS; SUBCUTANEOUS
Status: DISCONTINUED | OUTPATIENT
Start: 2017-06-07 | End: 2017-06-07

## 2017-06-07 RX ORDER — ASPIRIN 300 MG/1
300 SUPPOSITORY RECTAL DAILY
Status: ON HOLD | COMMUNITY
End: 2017-06-08

## 2017-06-07 RX ORDER — CLOPIDOGREL BISULFATE 75 MG/1
75 TABLET ORAL DAILY
Status: DISCONTINUED | OUTPATIENT
Start: 2017-06-07 | End: 2017-06-09 | Stop reason: HOSPADM

## 2017-06-07 RX ORDER — MORPHINE SULFATE 2 MG/ML
2-4 INJECTION, SOLUTION INTRAMUSCULAR; INTRAVENOUS
Status: DISCONTINUED | OUTPATIENT
Start: 2017-06-07 | End: 2017-06-09 | Stop reason: HOSPADM

## 2017-06-07 RX ORDER — NITROGLYCERIN 0.4 MG/1
0.4 TABLET SUBLINGUAL EVERY 5 MIN PRN
Status: DISCONTINUED | OUTPATIENT
Start: 2017-06-07 | End: 2017-06-09 | Stop reason: HOSPADM

## 2017-06-07 RX ORDER — ACETAMINOPHEN 650 MG/1
650 SUPPOSITORY RECTAL EVERY 4 HOURS PRN
Status: ON HOLD | COMMUNITY
End: 2017-06-08

## 2017-06-07 RX ORDER — BUSPIRONE HYDROCHLORIDE 7.5 MG/1
7.5 TABLET ORAL 2 TIMES DAILY
Status: DISCONTINUED | OUTPATIENT
Start: 2017-06-07 | End: 2017-06-09 | Stop reason: HOSPADM

## 2017-06-07 RX ORDER — FENTANYL CITRATE 50 UG/ML
25-50 INJECTION, SOLUTION INTRAMUSCULAR; INTRAVENOUS
Status: DISCONTINUED | OUTPATIENT
Start: 2017-06-07 | End: 2017-06-07 | Stop reason: HOSPADM

## 2017-06-07 RX ORDER — ONDANSETRON 4 MG/1
4 TABLET, ORALLY DISINTEGRATING ORAL EVERY 6 HOURS PRN
Status: ON HOLD | COMMUNITY
End: 2017-06-08

## 2017-06-07 RX ORDER — ONDANSETRON 2 MG/ML
4 INJECTION INTRAMUSCULAR; INTRAVENOUS EVERY 6 HOURS PRN
Status: DISCONTINUED | OUTPATIENT
Start: 2017-06-07 | End: 2017-06-09 | Stop reason: HOSPADM

## 2017-06-07 RX ORDER — CLINDAMYCIN PHOSPHATE 900 MG/50ML
900 INJECTION, SOLUTION INTRAVENOUS EVERY 8 HOURS
Status: ON HOLD | COMMUNITY
End: 2017-06-08

## 2017-06-07 RX ORDER — POLYETHYLENE GLYCOL 3350 17 G/17G
17 POWDER, FOR SOLUTION ORAL DAILY PRN
Status: ON HOLD | COMMUNITY
End: 2017-06-08

## 2017-06-07 RX ORDER — AMOXICILLIN 250 MG
1-4 CAPSULE ORAL 2 TIMES DAILY PRN
Status: ON HOLD | COMMUNITY
End: 2017-06-08

## 2017-06-07 RX ORDER — DULOXETIN HYDROCHLORIDE 60 MG/1
60 CAPSULE, DELAYED RELEASE ORAL AT BEDTIME
Status: DISCONTINUED | OUTPATIENT
Start: 2017-06-07 | End: 2017-06-09 | Stop reason: HOSPADM

## 2017-06-07 RX ORDER — LORATADINE 10 MG/1
10 TABLET ORAL DAILY
COMMUNITY

## 2017-06-07 RX ORDER — METOPROLOL TARTRATE 1 MG/ML
1-2 INJECTION, SOLUTION INTRAVENOUS EVERY 5 MIN PRN
Status: DISCONTINUED | OUTPATIENT
Start: 2017-06-07 | End: 2017-06-07 | Stop reason: HOSPADM

## 2017-06-07 RX ORDER — POTASSIUM CHLORIDE 1.5 G/1.58G
20-40 POWDER, FOR SOLUTION ORAL
Status: DISCONTINUED | OUTPATIENT
Start: 2017-06-07 | End: 2017-06-09

## 2017-06-07 RX ORDER — DEXTROSE MONOHYDRATE, SODIUM CHLORIDE, AND POTASSIUM CHLORIDE 50; 1.49; 4.5 G/1000ML; G/1000ML; G/1000ML
INJECTION, SOLUTION INTRAVENOUS CONTINUOUS
Status: DISCONTINUED | OUTPATIENT
Start: 2017-06-07 | End: 2017-06-07

## 2017-06-07 RX ORDER — ONDANSETRON 4 MG/1
4 TABLET, ORALLY DISINTEGRATING ORAL EVERY 6 HOURS PRN
Status: DISCONTINUED | OUTPATIENT
Start: 2017-06-07 | End: 2017-06-09 | Stop reason: HOSPADM

## 2017-06-07 RX ORDER — POTASSIUM CHLORIDE 750 MG/1
20-40 TABLET, EXTENDED RELEASE ORAL
Status: DISCONTINUED | OUTPATIENT
Start: 2017-06-07 | End: 2017-06-09

## 2017-06-07 RX ORDER — ONDANSETRON 2 MG/ML
4 INJECTION INTRAMUSCULAR; INTRAVENOUS EVERY 30 MIN PRN
Status: DISCONTINUED | OUTPATIENT
Start: 2017-06-07 | End: 2017-06-07 | Stop reason: HOSPADM

## 2017-06-07 RX ORDER — POTASSIUM CL/LIDO/0.9 % NACL 10MEQ/0.1L
10 INTRAVENOUS SOLUTION, PIGGYBACK (ML) INTRAVENOUS
Status: DISCONTINUED | OUTPATIENT
Start: 2017-06-07 | End: 2017-06-09

## 2017-06-07 RX ORDER — PIPERACILLIN SODIUM, TAZOBACTAM SODIUM 3; .375 G/15ML; G/15ML
3.38 INJECTION, POWDER, LYOPHILIZED, FOR SOLUTION INTRAVENOUS EVERY 6 HOURS
Status: DISCONTINUED | OUTPATIENT
Start: 2017-06-07 | End: 2017-06-07

## 2017-06-07 RX ORDER — NICOTINE POLACRILEX 4 MG
15-30 LOZENGE BUCCAL
Status: DISCONTINUED | OUTPATIENT
Start: 2017-06-07 | End: 2017-06-09 | Stop reason: HOSPADM

## 2017-06-07 RX ORDER — ALBUTEROL SULFATE 0.83 MG/ML
2.5 SOLUTION RESPIRATORY (INHALATION) EVERY 4 HOURS PRN
Status: DISCONTINUED | OUTPATIENT
Start: 2017-06-07 | End: 2017-06-09 | Stop reason: HOSPADM

## 2017-06-07 RX ORDER — MAGNESIUM SULFATE HEPTAHYDRATE 40 MG/ML
4 INJECTION, SOLUTION INTRAVENOUS EVERY 4 HOURS PRN
Status: DISCONTINUED | OUTPATIENT
Start: 2017-06-07 | End: 2017-06-09

## 2017-06-07 RX ADMIN — MORPHINE SULFATE 2 MG: 2 INJECTION, SOLUTION INTRAMUSCULAR; INTRAVENOUS at 17:04

## 2017-06-07 RX ADMIN — PANTOPRAZOLE SODIUM 40 MG: 40 INJECTION, POWDER, FOR SOLUTION INTRAVENOUS at 10:04

## 2017-06-07 RX ADMIN — MORPHINE SULFATE 2 MG: 2 INJECTION, SOLUTION INTRAMUSCULAR; INTRAVENOUS at 07:34

## 2017-06-07 RX ADMIN — INSULIN ASPART 6 UNITS: 100 INJECTION, SOLUTION INTRAVENOUS; SUBCUTANEOUS at 16:32

## 2017-06-07 RX ADMIN — FENTANYL CITRATE 50 MCG: 50 INJECTION, SOLUTION INTRAMUSCULAR; INTRAVENOUS at 00:25

## 2017-06-07 RX ADMIN — SODIUM CHLORIDE, POTASSIUM CHLORIDE, SODIUM LACTATE AND CALCIUM CHLORIDE: 600; 310; 30; 20 INJECTION, SOLUTION INTRAVENOUS at 00:15

## 2017-06-07 RX ADMIN — FLUCONAZOLE 200 MG: 2 INJECTION INTRAVENOUS at 07:35

## 2017-06-07 RX ADMIN — SODIUM CHLORIDE, POTASSIUM CHLORIDE, SODIUM LACTATE AND CALCIUM CHLORIDE: 600; 310; 30; 20 INJECTION, SOLUTION INTRAVENOUS at 03:18

## 2017-06-07 RX ADMIN — MORPHINE SULFATE 2 MG: 2 INJECTION, SOLUTION INTRAMUSCULAR; INTRAVENOUS at 17:43

## 2017-06-07 RX ADMIN — MORPHINE SULFATE 2 MG: 2 INJECTION, SOLUTION INTRAMUSCULAR; INTRAVENOUS at 20:59

## 2017-06-07 RX ADMIN — AMPICILLIN SODIUM AND SULBACTAM SODIUM 3 G: 2; 1 INJECTION, POWDER, FOR SOLUTION INTRAMUSCULAR; INTRAVENOUS at 19:36

## 2017-06-07 RX ADMIN — INSULIN ASPART 7 UNITS: 100 INJECTION, SOLUTION INTRAVENOUS; SUBCUTANEOUS at 11:31

## 2017-06-07 RX ADMIN — SODIUM CHLORIDE: 4.5 INJECTION, SOLUTION INTRAVENOUS at 15:40

## 2017-06-07 RX ADMIN — MORPHINE SULFATE 2 MG: 2 INJECTION, SOLUTION INTRAMUSCULAR; INTRAVENOUS at 21:53

## 2017-06-07 RX ADMIN — ASPIRIN 300 MG: 300 SUPPOSITORY RECTAL at 15:06

## 2017-06-07 RX ADMIN — INSULIN ASPART 4 UNITS: 100 INJECTION, SOLUTION INTRAVENOUS; SUBCUTANEOUS at 19:32

## 2017-06-07 RX ADMIN — ENOXAPARIN SODIUM 40 MG: 40 INJECTION SUBCUTANEOUS at 10:04

## 2017-06-07 RX ADMIN — AMPICILLIN SODIUM AND SULBACTAM SODIUM 3 G: 2; 1 INJECTION, POWDER, FOR SOLUTION INTRAMUSCULAR; INTRAVENOUS at 14:16

## 2017-06-07 RX ADMIN — MORPHINE SULFATE 2 MG: 2 INJECTION, SOLUTION INTRAMUSCULAR; INTRAVENOUS at 03:13

## 2017-06-07 RX ADMIN — MORPHINE SULFATE 2 MG: 2 INJECTION, SOLUTION INTRAMUSCULAR; INTRAVENOUS at 11:22

## 2017-06-07 RX ADMIN — AMPICILLIN SODIUM AND SULBACTAM SODIUM 3 G: 2; 1 INJECTION, POWDER, FOR SOLUTION INTRAMUSCULAR; INTRAVENOUS at 10:04

## 2017-06-07 RX ADMIN — HYDROMORPHONE HYDROCHLORIDE 0.5 MG: 1 INJECTION, SOLUTION INTRAMUSCULAR; INTRAVENOUS; SUBCUTANEOUS at 01:29

## 2017-06-07 RX ADMIN — METOPROLOL TARTRATE 2 MG: 5 INJECTION INTRAVENOUS at 00:29

## 2017-06-07 RX ADMIN — INSULIN ASPART 4 UNITS: 100 INJECTION, SOLUTION INTRAVENOUS; SUBCUTANEOUS at 04:50

## 2017-06-07 RX ADMIN — HYDROMORPHONE HYDROCHLORIDE 0.5 MG: 1 INJECTION, SOLUTION INTRAMUSCULAR; INTRAVENOUS; SUBCUTANEOUS at 00:36

## 2017-06-07 RX ADMIN — POTASSIUM CHLORIDE, DEXTROSE MONOHYDRATE AND SODIUM CHLORIDE: 150; 5; 450 INJECTION, SOLUTION INTRAVENOUS at 07:50

## 2017-06-07 RX ADMIN — INSULIN GLARGINE 22 UNITS: 100 INJECTION, SOLUTION SUBCUTANEOUS at 10:04

## 2017-06-07 RX ADMIN — INSULIN ASPART 7 UNITS: 100 INJECTION, SOLUTION INTRAVENOUS; SUBCUTANEOUS at 07:43

## 2017-06-07 RX ADMIN — MORPHINE SULFATE 2 MG: 2 INJECTION, SOLUTION INTRAMUSCULAR; INTRAVENOUS at 04:21

## 2017-06-07 RX ADMIN — FENTANYL CITRATE 50 MCG: 50 INJECTION, SOLUTION INTRAMUSCULAR; INTRAVENOUS at 01:08

## 2017-06-07 ASSESSMENT — PAIN DESCRIPTION - DESCRIPTORS
DESCRIPTORS: ACHING
DESCRIPTORS: SHARP
DESCRIPTORS: ACHING

## 2017-06-07 NOTE — PROGRESS NOTES
06/07/17 1009   General Information   Onset Date 06/06/17   Start of Care Date 06/07/17   Referring Physician Winnie Arriola MD    Patient Profile Review/OT: Additional Occupational Profile Info See Profile for full history and prior level of function   Patient/Family Goals Statement Pt would like to make sure things are going down the right way.    Swallowing Evaluation Bedside swallow evaluation   Behaviorial Observations WFL (within functional limits)   Mode of current nutrition NPO   Respiratory Status O2 Supply  (6/6/2017)   Type of O2 supply Nasal cannula   Comments Otilia Mckee is a 51-year-old female with findings concerning for free air around upper trachea (from OSH CT) and mediastinitis now POD #0 s/p Bronchoscopy with I&D of mediastinal abscess with drain placement. Patient has significant history of NSTEMI <2 weeks ago with stent placement on plavix/ASA (Patietn took dose 6/6, can not delay due to emergent nature of surgery). She is transferred to the SICU for close monitoring post surgery.   Clinical Swallow Evaluation   Oral Musculature generally intact   Structural Abnormalities none present   Dentition present and adequate   Mucosal Quality good   Mandibular Strength and Mobility intact   Oral Labial Strength and Mobility WFL   Lingual Strength and Mobility WFL   Laryngeal Function Cough;Throat clear;Swallow;Voicing initiated;Dry swallow palpated   Oral Musculature Comments WFL   Additional Documentation Yes   Clinical Swallow Eval: Thin Liquid Texture Trial   Mode of Presentation, Thin Liquids straw;spoon   Volume of Liquid or Food Presented ice chips x5, sips of water x3    Oral Phase of Swallow Premature pharyngeal entry   Pharyngeal Phase of Swallow impaired;coughing/choking;repeated swallows   Diagnostic Statement Positive s/s of aspiration.    Clinical Swallow Eval: Nectar Thick Liquid Texture Trial   Mode of Presentation, Nectar spoon;straw;fed by clinician   Volume of Nectar Presented  tsp water x1, sips via straw x3   Oral Phase, Nectar Premature pharyngeal entry   Pharyngeal Phase, Nectar impaired;coughing/choking;repeated swallows;throat clearing   Diagnostic Statement Positive s/s of aspiration.    Clinical Swallow Eval: Puree Solid Texture Trial   Mode of Presentation, Puree spoon;fed by clinician   Volume of Puree Presented 1 tsp bites x4    Oral Phase, Puree WFL   Pharyngeal Phase, Puree impaired;reduction in laryngeal movement;repeated swallows   Diagnostic Statement Functional swallow response.     Swallow Compensations   Swallow Compensations Reduce amounts;Pacing;Multiple swallow   Esophageal Phase of Swallow   Patient reports or presents with symptoms of esophageal dysphagia No   General Therapy Interventions   Planned Therapy Interventions Dysphagia Treatment   Dysphagia treatment Oropharyngeal exercise training;Modified diet education;Compensatory strategies for swallowing;Instruction of safe swallow strategies   Swallow Eval: Clinical Impressions   Skilled Criteria for Therapy Intervention Skilled criteria met.  Treatment indicated.   Functional Assessment Scale (FAS) 2   Treatment Diagnosis Moderate to severe oropharyngeal dysphagia    Diet texture recommendations NPO   Recommended Feeding/Eating Techniques small sips/bites   Demonstrates Need for Referral to Another Service dietitian;occupational therapy;physical therapy   Therapy Frequency daily   Predicted Duration of Therapy Intervention (days/wks) 2 weeks   Anticipated Discharge Disposition inpatient rehabilitation facility   Risks and Benefits of Treatment have been explained. Yes   Patient, family and/or staff in agreement with Plan of Care Yes   Clinical Impression Comments Pt seen bedside for swallow evaluation per MD orders.  PT presents with moderate to severe oropharyngeal dysphagia characterized by weakness.  Pt reported significant difficulty with swallow and exhibited positive s/s of aspiration on trials of thin  liquids and nectar thick liquids.  Purees tolerated without overt s/s of aspiration but multiple swallows needed to clear items.  At this time, pt is at increased risk for aspiration with current level of skills.  Recommend continue NPO with ice chips for comfort when fully alert and upright.  Pertient oral meds may be crushed and served in pureed textures.  ST to follow on a daily basis for PO readiness.     Total Evaluation Time   Total Evaluation Time (Minutes) 16

## 2017-06-07 NOTE — PROGRESS NOTES
SURGICAL ICU PROGRESS NOTE  June 7, 2017      CO-MORBIDITIES:   No diagnosis found.    ASSESSMENT: Ms. Otilia Mckee is a 51-year-old female with findings concerning for free air around upper trachea (from OSH CT) and mediastinitis now POD #0 s/p Bronchoscopy with I&D of mediastinal abscess with drain placement. Patient has significant history of NSTEMI <2 weeks ago with stent placement on plavix/ASA (Patietn took dose 6/6, can not delay due to emergent nature of surgery). She is transferred to the SICU for close monitoring post surgery.    PLAN TODAY:   - SLP eval  - Restart aspirin, plavix  - start 22U lantus qhs  - d/c zosyn switch to Unasyn  - To floor    PLAN:   Neuro/ pain/ sedation:  #Reported history of seizures vs pseudoseizures  #Migraines  -Monitor neurological status. Notify the MD for any acute changes in exam.  -IV medications for pain (morphine IV PRN), PO medications once tolerating orals for pain.     Pulmonary care:   #Mediastinal Abscess  -Intraoperatively found mediastinal abscess with penrose drain placement after I&D.  -Monitor closely for re-bleeding post surgery  -Supplemental oxygen to keep saturation above 92 %.  -Monitor for respiratory compromise      Cardiovascular:    #CAD with recent NSTEMI with stent placement < 1 month ago  #Anticoagulation post stent placement  -Monitor hemodynamic status.   -Restart aspirin and plavix PO     GI care:   -NPO except ice chips per SLP  -Advance diet as tolerated once stable     Fluids/ Electrolytes/ Nutrition:   -MIVF for IV fluid hydration  -Monitor BMP  -No indication for parenteral nutrition.     Renal/ Fluid Balance:    -Blackburn not necessary  -Will continue to monitor intake and output.     Endocrine:    DM2 on insulin  -No management indication.   -Sliding scale for diabetes management.   -Will restart at home regimen once taking PO intake     ID/ Antibiotics:  -Started on broad spectrum at OSH with clindamycin, vancomycin and caspofungin  -WBC  of 22 postop      Heme:     #Anemia  -Hgb stable     Prophylaxis:    -Mechanical prophylaxis for DVT.   -Lovenox  - IV PPI       Lines/ tubes/ drains:  -PIV     Disposition:  -Surgical ICU. Consider transfer if stable    Patient seen, findings and plan discussed with surgical ICU staff Dr. Jero Ospina   Surgery PGY2  [phone]      ====================================    TODAY'S PROGRESS:   SUBJECTIVE:   - No acute events overnight.   - Respiratory status stable   - Some difficulty swallowing   - Pain controlled     OBJECTIVE:   1. VITAL SIGNS:   Temp:  [97.5  F (36.4  C)-98.4  F (36.9  C)] 98  F (36.7  C)  Heart Rate:  [65-80] 78  Resp:  [10-20] 12  BP: (111-163)/(67-99) 122/78  MAP:  [79 mmHg-107 mmHg] 99 mmHg  Arterial Line BP: (112-162)/(56-78) 138/73  SpO2:  [94 %-99 %] 99 %  Resp: 12    2. INTAKE/ OUTPUT:   I/O last 3 completed shifts:  In: 1407.67 [I.V.:1407.67]  Out: 795 [Urine:775; Blood:20]    3. PHYSICAL EXAMINATION:   General: NAD  Neuro: A&Ox3  Resp: Breathing non-labored on NC, Neck wound covered, dressing with SS drainage.  CV: RRR  Abdomen: Soft, Non-distended, Non-tender  Extremities: warm and well perfused    4. INVESTIGATIONS:   Arterial Blood Gases     Recent Labs  Lab 06/06/17 2305   PH 7.32*   PCO2 40   PO2 91   HCO3 20*     Complete Blood Count     Recent Labs  Lab 06/07/17 0321 06/06/17 2305 06/06/17 2104   WBC 22.3*  --  20.5*   HGB 10.1* 9.6* 9.9*     --  240     Basic Metabolic Panel    Recent Labs  Lab 06/07/17 0321 06/06/17 2305 06/06/17  2104    140 142   POTASSIUM 4.4 3.9 4.2   CHLORIDE 110*  --  110*   CO2 24  --  24   BUN 23  --  22   CR 0.74  --  0.84   * 192* 184*     Liver Function Tests    Recent Labs  Lab 06/06/17  2104   INR 1.29*     Pancreatic Enzymes  No lab results found in last 7 days.  Coagulation Profile    Recent Labs  Lab 06/06/17 2104   INR 1.29*     Lactate  Invalid input(s): LACTATE    5. RADIOLOGY:   Recent Results (from  the past 24 hour(s))   XR Chest Port 1 View    Narrative    Exam: XR CHEST PORT 1 VW, 6/6/2017 9:49 PM    Indication: Mediastinitis    Comparison: None available.    Findings:   A single portable AP view the chest was obtained. The right IJ  catheter tip projects over the mid SVC. The cardiomediastinal  silhouette is within normal limits. No pneumothorax or  pneumomediastinum. No pleural effusion. Streaky bibasilar opacities.  The upper abdomen is unremarkable.      Impression    Impression:   1. Streaky bibasilar opacities, likely atelectasis.  2. No pneumomediastinum appreciated.    I have personally reviewed the examination and initial interpretation  and I agree with the findings.    STEFFI MOELLER MD       =========================================    Physician Attestation   I, Lawrence Nogueira, saw this patient with the resident and agree with the resident s findings and plan of care as documented in the resident s note.      I personally reviewed vital signs, medications, labs and imaging.    Key findings: 51-year-old female with mediastinitis now POD #0 s/p Bronchoscopy with I&D of mediastinal abscess with drain placement. Patien also had a NSTEMI <2 weeks ago with stent placement on plavix/ASA. She is transferred to the SICU for close monitoring post surgery. Likely to be transferred to the floor today        Lawrence Nogueira  Date of Service (when I saw the patient): 6/7/2017

## 2017-06-07 NOTE — PROGRESS NOTES
CLINICAL NUTRITION SERVICES - ASSESSMENT NOTE     Nutrition Prescription    RECOMMENDATIONS FOR MDs/PROVIDERS TO ORDER:  1. Diet adv vs nutrition support to begin within 1-3 days  2. Would rec switch to non-dextrose containing IVF if/when TF start as pt at refeeding risk    Malnutrition Status:    Unable to determine due to not appropriate during first visit and pt later not in room 2/2 transferring to floor    Recommendations already ordered by Registered Dietitian (RD):  Once FT placed/placement confirmed and okay to begin TF per per provider, rec start Impact Peptide 1.5 (immune-modulating) @ 15 mL/hr and adv by 10 mL q8h as tolerated to goal Impact Peptide @ goal 45 ml/hr (1080 ml/day) to provide 1620 kcals, 102 g PRO, 832 ml free H2O, 69 g Fat (50% from MCTs), 151 g CHO and no Fiber daily.   -- Do not start or adv TF unless K+/Mg++ >/= nrml and phos >1.9. Monitor lytes daily while adv to goal TF to watch for signs of refeeding    -- Baseline CRP and weekly monitoring to assess ongoing need for  immune-modulating TF formula vs consider switch to more maintenance formula  -- 30 mL q4h free water flushes for FT patency  -- Ordered multivitamin with minerals (Certavite, 15 mL/day) to help meet micronutrient needs with potential for TF interruptions / inadequate PO intakes     Future/Additional Recommendations:  1. If diet adv, offer supplements.  Once/if diet adv > full liquids, recommend ordering calorie counts to assess PO intake adequacy vs need to adjust TF provisions    2. If appropriate to switch to a more maintenance formula, would rec goal Isosource 1.5 @ goal 45 ml/hr (1080 ml/day) to provide 1620 kcals, 73 g PRO, 821 ml free H2O, 190 g CHO and 16 g Fiber daily.   -- Would recommend additional 4 pkts Beneprotein (2 pkts BID for additional 100 kcal and 24 g PRO) so TF + beneprotein would provide 1720 kcal (25 kcal/kg) and 97 g PRO (1.4 g/kg)     REASON FOR ASSESSMENT  Otilia Mckee is a/an 51 year old female  "assessed by the dietitian for Provider Order - Registered Dietitian to Assess and Order TF per Medical Nutrition Therapy Protocol    NUTRITION HISTORY  Attempted to obtain nutrition hx from pt and pt's , but pt became tearful upon hearing about potential FT (pt was not aware this was POC) and asked to speak with MD. Per pt's , pt's current medical conditions are due to something going wrong with a tube which is likely why she was becoming tearful.    CURRENT NUTRITION ORDERS  Diet: NPO  Intake/Tolerance: SLP rec NPO today 2/2 moderate to severe oropharyngeal dysphagia    LABS  Labs reviewed    MEDICATIONS  Medications reviewed  - D5 IVF @ 50 mL/hr    ANTHROPOMETRICS  Height: 165.1 cm (5' 5\")  Most Recent Weight: 97.5 kg (215 lb)    IBW: 56.8 kg   BMI: Obesity Grade II BMI 35-39.9  Weight History:   Wt Readings from Last 10 Encounters:   06/06/17 97.5 kg (215 lb)      Dosing Weight: 70 kg (adjusted based on admit wt and IBW)    ASSESSED NUTRITION NEEDS  Estimated Energy Needs: 0542-1291 kcals/day (20 - 25+ kcals/kg)  Justification: Obese, aim higher end for healing  Estimated Protein Needs:  grams protein/day (1.2 - 1.5 grams of pro/kg)  Justification: Hypercatabolism with acute illness and Post-op  Estimated Fluid Needs: (25 - 30 mL/kg) or per provider  Justification: Maintenance    PHYSICAL FINDINGS  See malnutrition section below.    MALNUTRITION  % Intake: Unable to assess  % Weight Loss: Unable to assess  Subcutaneous Fat Loss: None observed but unable to fully assess at this time  Muscle Loss: None observed but unable to fully assess at this time  Fluid Accumulation/Edema: None noted per provider note  Malnutrition Diagnosis: Unable to determine due to not appropriate during first visit and pt later not in room 2/2 transferring to floor    NUTRITION DIAGNOSIS  Inadequate protein-energy intake related to unable to adv diet 2/2 dysphagia and no nutrition support yet started as evidenced by NPO " x 1 day since admit    INTERVENTIONS  Implementation  Nutrition Education: Unable to fully complete due to not appropriate pt became tearful regaring plan of care during visit  Collaboration with other providers: after discussed writer's visit with patient with provider, MD discussed FT placement and starting TF with patient who is agreeable (pt is currently transferring to floor so radiology will be placing FT). MD request RD enter TF orders so orders are in if/when ready to begin TF and okay with RD releasing FT consult to radiology  Enteral Nutrition - Initiate  Feeding tube flush  Multivitamin/mineral supplement therapy     Goals  1. Diet adv vs nutrition support to begin within 1-3 days  2. Total avg nutritional intake to meet a minimum of 20 kcal/kg and 1.2 g PRO/kg daily (per dosing wt 70 kg).     Monitoring/Evaluation  Progress toward goals will be monitored and evaluated per protocol.     Roslyn Young RD, LD   SICU RD pager: [phone]

## 2017-06-07 NOTE — CONSULTS
Social Work: Assessment with Discharge Plan    Patient Name:  Otilia Mckee  :  1965  Age:  51 year old  MRN:  [id number]  Risk/Complexity Score:   Filed Complexity Score: 5  Completed assessment with:  Patient and , Carlos.     Presenting Information   Reason for Referral:  Financial issues; ICU admission; psychosocial assessment, patient/family support and discharge planning.   Date of Intake:  2017  Referral Source:  Consult.   Decision Maker:  Self at baseline.   Alternate Decision Maker:  In absence of a health care directive, next of kin: : Carlos Mckee: [phone].   Health Care Directive:  Not on file.   Living Situation:  Lives at home with her , Carlos in [place].   Previous Functional Status:  Independent; works for the [employer] and was here in [place] for work when she had this medical episode.   Patient and family understanding of hospitalization:  Restorative; full treatment.   Cultural/Language/Spiritual Considerations:  None specific reported.   Adjustment to Illness:  No concerns noted.     Physical Health  Reason for Admission:  No diagnosis found.  Services Needed/Recommended:    Patient and  are anticipating patient will d/c directly home to [place] - assuming she will be safe to travel - once medically stable.   reports they have a vehicle here and plan to drive back at d/c.     Mental Health/Chemical Dependency  Diagnosis:  NA  Support/Services in Place:  NA  Services Needed/Recommended:  NA    Support System  Significant relationship at present time:  , Carlos.   Family of origin is available for support:  Yes; at the bedside.   Other support available:  Uncertain.   Gaps in support system:  NA  Patient is caregiver to:  NA     Provider Information   Primary Care Physician:  No primary care provider on file.   None   Clinic:  No primary physician on file.      :  [facility]    Financial   Income Source:  Employment.   Financial Concerns:    requested meal vouchers, financial assistance for lodging, parking and gas to get back home to [place]. Discussed with  that resources are quite limited; offered options for lodging and discussed parking options with him, as well.   Insurance:    Payor/Plan Subscriber Name Rel Member # Group #   [employer]  [id number]        [id number], [facility] [address]       Discharge Plan   Patient and family discharge goal:  Home when medically cleared.   Provided education on discharge plan:  YES  Patient agreeable to discharge plan:  Pending final recommendations.   Barriers to discharge:  Medical stability; insurance coverage - given patient has [facility], she will not have coverage for resources in [place], i.e.: TCU, HHC, etc.     Discharge Recommendations   Anticipated Disposition:  Home to [place] with her .  Transportation Needs:  .   Name of Transportation Company and Phone:  .     Additional comments   Await patient progress and further recommendations.   Will continue to follow.     JOSE RAUL Serrano, [facility]  ICU   Pager: [phone]  Phone: [phone]

## 2017-06-07 NOTE — ANESTHESIA POSTPROCEDURE EVALUATION
Patient: Otilia Mckee    Procedure(s):  Neck Exploration, Bronchoscopy - Wound Class: I-Clean   - Wound Class: II-Clean Contaminated   - Wound Class: I-Clean    Diagnosis:Mediastinal Abcess  Diagnosis Additional Information: No value filed.    Anesthesia Type:  General, ETT, RSI    Note:  Anesthesia Post Evaluation    Patient location during evaluation: PACU  Patient participation: Able to fully participate in evaluation  Level of consciousness: awake and alert  Pain management: adequate  Airway patency: patent  Cardiovascular status: acceptable and hemodynamically stable  Respiratory status: acceptable and room air  Hydration status: acceptable  PONV: none     Anesthetic complications: None    Comments: Patient hemodynamically stable in PACU following procedure.  Post-operative EKG shows sinus rhythm with no ST or T wave changes concerning for ischemia.  Patient denies symptoms concerning for myocardial ischemia.  Patient transferred to intensive care unit for further management.            Last vitals:  Vitals:    06/07/17 0421 06/07/17 0435 06/07/17 0500   BP:      Resp: 10 12 12   Temp:      SpO2: 96% 96% 97%         Electronically Signed By: Jero Mcdaniels MD  June 7, 2017  5:48 AM

## 2017-06-07 NOTE — BRIEF OP NOTE
[facility], [place]    Brief Operative Note    Pre-operative diagnosis: Mediastinal Abcess  Post-operative diagnosis Mediastinal Abcess  Procedure: Procedure(s):  Neck Exploration, Bronchoscopy - Wound Class: I-Clean   - Wound Class: II-Clean Contaminated   - Wound Class: I-Clean  Surgeon: Surgeon(s) and Role:     * Jacky Sepulvdea MD - Primary     * Seamus Mccollum MD - Assisting  Anesthesia: General   Estimated blood loss: 20mL  Drains: 1/2 inch penrose drain  Specimens:   ID Type Source Tests Collected by Time Destination   1 : MEDIASTINAL ABSCESS Abscess Chest ABSCESS CULTURE AEROBIC BACTERIAL, FUNGUS CULTURE Jacky Sepulveda MD 6/6/2017 11:16 PM      Findings:   abscess  Complications: none  Implants: none

## 2017-06-07 NOTE — PLAN OF CARE
Problem: Goal Outcome Summary  Goal: Goal Outcome Summary  Outcome: No Change  Admission  D/I:  Pt arrived with EMS via stretcher at 2030.  Walked to bed.  Pt alert, oriented.  VSS, afebrile.  with pt.  Thoracic MD here to see pt, consent signed for OR.  Anesthesia here and took pt to OR at 2140.  Pt glasses, brush and lotion at bedside.   stated he has the rest of her belongings.  asking for  to help with lodging and finances while here.  EKG, CXR and lab work done pre-op.   A/P:  Continue current plan of care.  Pt to return to 4A post-op.  See flow sheets for further data.

## 2017-06-07 NOTE — PHARMACY-ADMISSION MEDICATION HISTORY
Admission medication history interview status for the 6/6/2017 admission is complete. See Epic admission navigator for allergy information, pharmacy, prior to admission medications and immunization status.     Medication history interview sources:  Patient sedated and unavailable for interview.  All info from obtained from [facility] (sent with patient on transfer)    Changes made to PTA medication list (reason)  Added: all active meds from MAR on time of transfer added  Deleted: none  Changed: none    Additional medication history information (including reliability of information, actions taken by pharmacist):All active meds from MAR on time of transfer were updated. Patient did receive 1 dose of vancomycin, 1 dose of imipenem, and 1 dose of caspofungin prior to transfer. During the hospital admission, the patient also had insulin glargine 45 units at , isosorbide mononitrate ER 30mg daily, and lisinopril 5mg daily ordered.  However, these were dc'd prior to transfer.       Prior to Admission medications    Medication Sig Last Dose Taking? Auth Provider   ACETAMINOPHEN PO Take 650 mg by mouth every 4 hours as needed for pain or other (headache, pain, temp>101.5) 6/4/2017 at 0638 Yes Unknown, Entered By History   acetaminophen (TYLENOL) 650 MG Suppository Place 650 mg rectally every 4 hours as needed for fever 6/6/2017 at 0155 Yes Unknown, Entered By History   aspirin 300 MG Suppository Place 300 mg rectally daily 6/6/2017 at 1207 Yes Unknown, Entered By History   bisacodyl (DULCOLAX) 10 MG Suppository Place 10 mg rectally daily as needed for constipation  Yes Unknown, Entered By History   BUSPIRONE HCL PO Take 7.5 mg by mouth 2 times daily 6/5/2017 at 2008 Yes Unknown, Entered By History   clindamycin (CLEOCIN) 900 MG/50ML infusion Inject 900 mg into the vein every 8 hours 6/6/2017 at 1733 Yes Unknown, Entered By History   CLOPIDOGREL BISULFATE PO Take 75 mg by mouth daily 6/5/2017 at 0832 Yes Unknown,  Entered By History   DULOXETINE HCL PO Take 60 mg by mouth At Bedtime 6/5/2017 at 2008 Yes Unknown, Entered By History   RacEPINEPHrine 2.25 % neb solution Take 0.5 mLs by nebulization every 4 hours as needed for shortness of breath / dyspnea or wheezing 6/6/2017 at 0607 Yes Unknown, Entered By History   FUROSEMIDE PO Take 20 mg by mouth daily 6/5/2017 at 0833 Yes Unknown, Entered By History   GABAPENTIN PO Take 600 mg by mouth 3 times daily 6/5/2017 at 2200 Yes Unknown, Entered By History   HEPARIN, PORCINE, IN NACL IJ Inject 5,000 Units as directed every 8 hours 6/6/2017 at 1329 Yes Unknown, Entered By History   HYDROMORPHONE HCL IJ Inject 0.2-0.5 mg as directed every 4 hours as needed (pain) 6/6/2017 at 1329 Yes Unknown, Entered By History   loratadine (CLARITIN) 10 MG tablet Take 10 mg by mouth daily 6/5/2017 at 0833 Yes Unknown, Entered By History   LORAZEPAM IJ Inject 0.5-1 mg as directed every 4 hours as needed (anxiety) 6/5/2017 at 2208 Yes Unknown, Entered By History   benzocaine-menthol (CHLORASEPTIC) 6-10 MG lozenge Place 1 lozenge inside cheek every 2 hours as needed for sore throat 6/5/2017 at 2009 Yes Unknown, Entered By History   Nitroglycerin (NITROSTAT SL) Place 0.4 mg under the tongue every 5 minutes as needed for chest pain  Yes Unknown, Entered By History   ondansetron (ZOFRAN-ODT) 4 MG ODT tab Take 4 mg by mouth every 6 hours as needed for nausea  Yes Unknown, Entered By History   OXYCODONE HCL PO Take 5-10 mg by mouth every 4 hours as needed (pain) 6/5/2017 at 2200 Yes Unknown, Entered By History   PANTOPRAZOLE SODIUM PO Take 40 mg by mouth daily 6/6/2017 Yes Unknown, Entered By History   polyethylene glycol (MIRALAX/GLYCOLAX) Packet Take 17 g by mouth daily as needed for constipation  Yes Unknown, Entered By History   ROSUVASTATIN CALCIUM PO Take 20 mg by mouth daily 6/5/2017 at 0834 Yes Unknown, Entered By History   senna-docusate (SENOKOT-S;PERICOLACE) 8.6-50 MG per tablet Take 1-4 tablets  by mouth 2 times daily as needed for constipation 6/4/2017 at 0843 Yes Unknown, Entered By History   TOPIRAMATE PO Take 25 mg by mouth daily 6/5/2017 at 0834 Yes Unknown, Entered By History         Medication history completed by: Graham Pritchett, pharmD

## 2017-06-07 NOTE — PHARMACY-CONSULT NOTE
Pharmacy Tube Feeding Consult    Medication reviewed for administration by feeding tube and for potential food/drug interactions.    Recommendation: No changes are needed at this time.     Pharmacy will continue to follow as new medications are ordered.    Vern MirelesD

## 2017-06-07 NOTE — PROGRESS NOTES
SPIRITUAL HEALTH SERVICES  SPIRITUAL ASSESSMENT Progress Note  [facility] ([place]) 4A   ON-CALL VISIT    Otilia was resting post surgery with  asleep on bench. She welcomed visit and asked for prayer. We prayed Lord's Prayer +, after which Otilia expressed heartfelt thanks. I will inform unit . Otilia will appreciate follow up visits.      Sanjeev Fields  Chaplain Resident  Pager [phone]

## 2017-06-07 NOTE — OR NURSING
Pt to the Pacu from the OR. Pt sedated but awakens easily. Vitals stable though BP elevated. Dr Mcdaniels-Anesthesiologist at bedside assessing Pt. He ordered metoprolol for BP and an EKG. Pt given BP and pain meds and BP decreased to WNL's. Dr Mcdaniels at bedside bofore Pt sent to SICU assessing Pt. Pt stable. Vitals stable. Pt to SICU without incident.

## 2017-06-07 NOTE — PROGRESS NOTES
SPIRITUAL HEALTH SERVICES  SPIRITUAL ASSESSMENT Progress Note  [facility] ([place]) 4A   ON-CALL VISIT    REFERRAL SOURCE: Patient requested pre-surgery prayer via epic consult.     Provided prayer via telephone for urgent surgery to help provide emotional and spiritual support. Patient requested follow up care after surgery tomorrow and beyond.     PLAN: I will alert the morning on-call  of the ongoing need and follow up with the patient as the unit  tomorrow.       Leila Renee  Chaplain Resident  Pager [phone]

## 2017-06-07 NOTE — PROGRESS NOTES
SPIRITUAL HEALTH SERVICES  SPIRITUAL ASSESSMENT Progress Note  [facility] ([place]) 6B   ON-CALL VISIT    Otilia recovering from surgery this morning.  Carlos described financial difficulty of being away from home in [place] and the expenses of being here in  and out of work. He told me he had discussed this with  and was told there were no programs that could help him with the items needed for food, gasoline for car and lodging. He will sleep in the hospital tonight and he asked if I could learn if there are resources in the [facility] to help him. I told him I would return to the [facility] sometime tomorrow after I make inquiry. Otilia asked for prayer and I led in prayer prior to leaving.      Sanjeev Fields  Chaplain Resident  Pager [phone]

## 2017-06-07 NOTE — PLAN OF CARE
Problem: Goal Outcome Summary  Goal: Goal Outcome Summary  SLP: Pt seen bedside for swallow evaluation per MD orders.  PT presents with moderate to severe oropharyngeal dysphagia characterized by weakness.  Pt reported significant difficulty with swallow and exhibited positive s/s of aspiration on trials of thin liquids and nectar thick liquids.  Purees tolerated without overt s/s of aspiration but multiple swallows needed to clear items.  At this time, pt is at increased risk for aspiration with current level of skills.  Recommend continue NPO with ice chips for comfort when fully alert and upright.  Pertient oral meds may be crushed and served in pureed textures.  ST to follow on a daily basis for PO readiness.

## 2017-06-07 NOTE — H&P
SURGICAL ICU ADMISSION NOTE  6/6/2017      ASSESSMENT: Ms. Otilia Mckee is a 51-year-old female with findings concerning for free air around upper trachea (from OSH CT) and mediastinitis now POD #0 s/p Bronchoscopy with I&D of mediastinal abscess with drain placement. Patient has significant history of NSTEMI <2 weeks ago with stent placement on plavix/ASA (Patietn took dose 6/6, can not delay due to emergent nature of surgery). She is transferred to the SICU for close monitoring post surgery.    PLAN:   Neuro/ pain/ sedation:  #Reported history of seizures vs pseudoseizures  #Migraines  -Monitor neurological status. Notify the MD for any acute changes in exam.  -IV medications for pain (morphine IV PRN), PO medications once tolerating orals for pain.  -IV sedation if necessary for anxiety     Pulmonary care:   #Medistinal Abscess  -Intraoperatively found mediastinal abscess with penrose drain placement after I&D.  -Monitor closely for re-bleeding post surgery  -Supplemental oxygen to keep saturation above 92 %.  -Monitor for respiratory compromise      Cardiovascular:    #CAD with recent NSTEMI with stent placement < 1 month ago  #Anticoagulation post stent placement  -Monitor hemodynamic status.   -Will discuss on restarting anticoagulation with regular dose today (patient took regular dose yesterday on DOS  -Will need close monitoring in post operative period for stent thrombosis     GI care:   -NPO except ice chips and medications for now  -Advance diet as tolerated once stable     Fluids/ Electrolytes/ Nutrition:   -MIVF for IV fluid hydration  -Monitor BMP  -No indication for parenteral nutrition.     Renal/ Fluid Balance:    -Blackburn not necessary  -Will continue to monitor intake and output.     Endocrine:    DM2 on insulin  -No management indication.   -Sliding scale for diabetes management.   -Will restart at home regimen once taking PO intake     ID/ Antibiotics:  -Started on broad spectrum at OSH with  clindamycin, vancomycin and caspofungin  -Monitor fever curve/trend  -Consider restarting antibiotics      Heme:     #Anemia  -Hemoglobin initially 9.9.   -Trend hemoglobin post surgery, blood loss during surgery minimal (<20ml)     Prophylaxis:    -Mechanical prophylaxis for DVT.   -No chemical DVT prophylaxis due to high risk of bleeding.        Lines/ tubes/ drains:  -PIV     Disposition:  -Surgical ICU. Consider transfer if stable    Patient seen, findings and plan discussed with surgical ICU staff Dr. Burgos.    Anant Mulligan MD  Anesthesia Resident, CA-1/PGY-2  - - - - - - - - - - - - - - - - - - - - - - - - - - - - - - - - - - - - - - - - - - - - - - - - - - - - - - - - - - - - - - - - - - - - - -     PRIMARY TEAM: Thoracic  PRIMARY PHYSICIAN: Dr. Sepulveda    REASON FOR CRITICAL CARE ADMISSION: Post surgical monitoring   ADMITTING PHYSICIAN: Dr. Burgos    Limited HPI at time of arrival and ICU admission, minimal OSH records, patient also unsure of complete medical history    HISTORY PRESENTING ILLNESS: 51-year-old female with PMH as below now with concern for mediastinitis with free air around trachea. Patient has significant history over past month with several bouts of ICU admission with intubation. She noted that it began when she was admitted for possible seizures? And intubated with findings of NSTEMI during admission with stent placement (unknown drug eluting or bare metal) and started on ASA/plavix. After discharge, she has another course of dyspnea requiring intubation and vasopressor support, which included a bronchoscopy on 6/2 with suggestions of upper tracheal edema. Patient was intubated again after scope. On follow up CT it was noted that she had free air around the trachea and started on broad spectrum antibiotics and transferred to [facility] for monitoring.    On arrival to the ICU, she was stable and conversant but poor historian. She complained of some throat discomfort but denies overt  shortness of breath. She was transferred to the OR and returned to the SICU after stabilization in the PACU, extubated successfully in the OR.    REVIEW OF SYSTEMS: As noted above. Denies any chest pain, improvement in shortness of breath and easier time swallowing after surgery. All other review of systems negative unless noted above.    PAST MEDICAL HISTORY:   No past medical history on file.CAD with NSTEMI s/p Stenting (~5 stents per patient, most recent ~2 weeks ago with stent placement and initiation of plavix/aspirin)., DM2 on insulin, migraines, questionable seizures vs pseudoseizures    SURGICAL HISTORY:   No past surgical history on file.Reported prior General anesthesia with Hysterectomy    FAMILY HISTORY: No bleeding/clotting disorders nor problems with anesthesia.    ALLERGIES:      Allergies   Allergen Reactions     Contrast Dye Shortness Of Breath and Hives       MEDICATIONS:Patient unsure of full medication list, she does take aspirin and Plavix post stent placement, metformin, insulin (for >10 years), and unknown blood pressure medications.    No current facility-administered medications on file prior to encounter.   No current outpatient prescriptions on file prior to encounter.    PHYSICAL EXAMINATION:  Temp:  [97.5  F (36.4  C)-98.4  F (36.9  C)] 97.7  F (36.5  C)  Heart Rate:  [65-80] 79  Resp:  [14-20] 16  BP: (111-163)/(67-99) 122/78  MAP:  [95 mmHg-107 mmHg] 97 mmHg  Arterial Line BP: (128-162)/(64-78) 137/70  SpO2:  [94 %-98 %] 96 %  General: Alert, oriented on supplemental oxygen, lying supine at ~20 degrees  HEENT: Normocephalic, atraumatic.   Neck: Large gauze dressing over left anterior chest into upper clavicle and neck, no signs of ongoing bleeding or dressing saturation  Chest wall: Symmetric thorax.   Respiratory: Non-labored breathing. Coarse lung sounds bilaterally  Cardiovascular: Regular rate and rhythm  Gastrointestinal: Abdomen soft, non-distended, non-tender to palpation. No  organomegaly or masses appreciated. No inguinal hernias.  Extremities: Moving all four extremities. No limb deformities. No pedal edema  Skin: As noted above. No rashes or lesions appreciated.    LABS: Reviewed.   Arterial Blood Gases     Recent Labs  Lab 06/06/17 2305   PH 7.32*   PCO2 40   PO2 91   HCO3 20*     Complete Blood Count     Recent Labs  Lab 06/06/17 2305 06/06/17 2104   WBC  --  20.5*   HGB 9.6* 9.9*   PLT  --  240     Basic Metabolic Panel    Recent Labs  Lab 06/06/17 2305 06/06/17 2104    142   POTASSIUM 3.9 4.2   CHLORIDE  --  110*   CO2  --  24   BUN  --  22   CR  --  0.84   * 184*     Liver Function Tests    Recent Labs  Lab 06/06/17 2104   INR 1.29*     Pancreatic Enzymes  No lab results found in last 7 days.  Coagulation Profile    Recent Labs  Lab 06/06/17 2104   INR 1.29*     Lactate  Invalid input(s): LACTATE    IMAGING:  No results found for this or any previous visit.

## 2017-06-07 NOTE — PLAN OF CARE
Problem: Goal Outcome Summary  Goal: Goal Outcome Summary  Outcome: Improving  D/I= 50 yo female arrived to rm 4-222 this am after undergoing exploration of neck, bronchoscopy and biopsy of mass on neck w/ penrose drain placement. Skilled nursing assessments and adm of analgesic for pain @ operative site. A/P= NPO. No drainage noted on drsg. ABle to get IS to 650cc and is very cooperative w/ this. Has harsh productive cough and able to use yankauer. VSS. Will likely be able to go up to floor bed today.

## 2017-06-07 NOTE — ANESTHESIA PROCEDURE NOTES
Arterial Line Procedure Note  Staff:     Anesthesiologist:  JESSICA GARCIA  Location: In OR After Induction  Procedure Start/Stop Times:     patient identified, IV checked, site marked, risks and benefits discussed, informed consent, monitors and equipment checked, pre-op evaluation and at physician/surgeon's request      Correct Patient: Yes      Correct Position: Yes      Correct Site: Yes      Correct Procedure: Yes      Correct Laterality:  Yes    Site Marked:  Yes  Line Placement:     Procedure:  Arterial Line    Insertion Site:  Radial    Insertion laterality:  Right    Skin Prep: Chloraprep      Patient Prep: patient draped, mask, sterile gloves, hat and hand hygiene      Local skin infiltration:  None    Ultrasound Guided?: No      Catheter size:  20 gauge, 12 cm    Cath secured with: suture      Dressing:  Tegaderm    Complications:  None obvious    Arterial waveform: Yes      IBP within 10% of NIBP: Yes

## 2017-06-07 NOTE — ANESTHESIA PREPROCEDURE EVALUATION
Anesthesia Evaluation     . Pt has had prior anesthetic.            ROS/MED HX    ENT/Pulmonary: Comment: CT reveals pneumomediastinum.      (+)TANIA risk factors obese, , . Other pulmonary disease .    Neurologic: Comment: Recent pseudoseizures requiring hospitalization.        Cardiovascular: Comment: As per H&P, the patient suffered an NSTEMI requiring stent placement within the last month.  The patient is currently receiving antiplatelet therapy.       (+) --CAD, -past MI,-stent,. Taking blood thinners : . . . :. . No previous cardiac testing       METS/Exercise Tolerance:  >4 METS   Hematologic: Comments: Anemia.  Hgb 9.9 today.      (+) Anemia, -      Musculoskeletal:  - neg musculoskeletal ROS       GI/Hepatic:  - neg GI/hepatic ROS       Renal/Genitourinary:  - ROS Renal section negative       Endo:     (+) type II DM Obesity, .      Psychiatric:  - neg psychiatric ROS       Infectious Disease:  - neg infectious disease ROS       Malignancy:      - no malignancy   Other:    - neg other ROS                 Physical Exam  Normal systems: pulmonary and dental    Airway   Mallampati: III  TM distance: >3 FB  Neck ROM: full    Dental     Cardiovascular   Rhythm and rate: regular and normal      Pulmonary                        Lab / Radiology Results:   Reviewed current labs when avail, see EMR for details.      BMP:  No results for input(s): NA, POTASSIUM, CHLORIDE, CO2, BUN, CR, GLC, TYLER in the last 31326 hours.    Invalid input(s): MG    LFTs:   No results for input(s): PROTTOTAL, ALBUMIN, BILITOTAL, ALKPHOS, AST, ALT, BILIDIRECT in the last 48509 hours.    CBC:   No results for input(s): WBC, HGB, PLT in the last 08823 hours.    Coags:  No results for input(s): INR, PTT, FIBR in the last 33860 hours.    Blood Bank:  Lab Results   Component Value Date    ABO Pending 06/06/2017    RH Pending 06/06/2017    AS Pending 06/06/2017       Studies:  See EMR for current studies, reviewed when available.      Anesthesia  Plan      History & Physical Review  History and physical reviewed and following examination; no interval change.    ASA Status:  3 emergent.    NPO Status:  Waived due to emergency    Plan for General, ETT and RSI with Intravenous induction. Maintenance will be Balanced.    PONV prophylaxis:  Ondansetron (or other 5HT-3)  Additional equipment: Videolaryngoscope, 2nd IV and Arterial Line After reviewing the patient's history and physical examination, it appears that she very recently suffered a non-ST elevation myocardial infarction and required stent placement.  Due to the emergent nature of the procedure (e.g. Free mediastinal air), we will proceed to the operating room without further testing.  We will attempt to maintain the patient's heart rate between 60 and 80 beats per minute.  We will attempt adequate diastolic blood pressures in order to aid coronary perfusion pressure.      Jero Mcdaniels MD  Anesthesiologist  9:33 PM  June 6, 2017        Postoperative Care  Postoperative pain management:  Multi-modal analgesia.      Consents  Anesthetic plan, risks, benefits and alternatives discussed with:  Patient.  Use of blood products discussed: Yes.   Use of blood products discussed with Patient.  Consented to blood products.  .

## 2017-06-07 NOTE — ANESTHESIA CARE TRANSFER NOTE
Patient: Otilia Mckee    Procedure(s):  Neck Exploration, Bronchoscopy - Wound Class: I-Clean   - Wound Class: II-Clean Contaminated   - Wound Class: I-Clean    Diagnosis: Mediastinal Abcess  Diagnosis Additional Information: No value filed.    Anesthesia Type:   General, ETT, RSI     Note:  Airway :Face Mask  Patient transferred to:PACU  Comments: Patient awake and responds appropriately, breathing nonlabored, pain adequately controlled, VSS, report given.      Vitals: (Last set prior to Anesthesia Care Transfer)    CRNA VITALS  6/6/2017 2315 - 6/7/2017 0006      6/7/2017             ART BP: 148/66    Ht Rate: 74    SpO2: 99 %    EKG: Sinus rhythm                Electronically Signed By: Claire Walker MD  June 7, 2017  12:06 AM

## 2017-06-07 NOTE — PROGRESS NOTES
"Thoracic Surgery Daily Progress Note  Otilia Mckee  06/07/2017    Subjective:   Returned from OR yesterday evening. Peritracheal abscess found and debrided.     Objective:    /78 (BP Location: Left arm)  Temp 97.7  F (36.5  C) (Axillary)  Resp 16  Ht 1.651 m (5' 5\")  Wt 97.5 kg (215 lb)  SpO2 97%  BMI 35.78 kg/m2  Laying in bed in no acute distress  Awake, alert and appropriate  Non-labored breathing on nasal cannula, persistent hoarse voice (baseline from admission)  Dressing in place, no strike though  Extremities warm, well perfused  Peripheral IV in foot    Ins/Outs:   I/O last 3 completed shifts:  In: 1407.67 [I.V.:1407.67]  Out: 795 [Urine:775; Blood:20]    Labs:   BMP unremarkable. Leukocytosis to 22.3    Recent Imaging:   Reviewed OSH imaging, pre op CXR    Assessment:  Otilia Mckee is a 51 year old female with mediastinal abscess s/p exploration and drainage.     Plan:     Appreciate ICU care  Would get bedside swallow eval and if safe, can start clears  Okay to resume asa/plavix once cleared from swallow  Zosyn/fluconasole for neck/mediastinal abscess  Would obtain durable peripheral access (may need PICC), and discontinue central lines, foot IV, a-line, etc  Insulin for DM  Need to obtain OSH records/home medications  Protonix for GI prophylaxis  Lovenox for DVT prophylaxis  May be able to transfer to floor later today  Will do daily dressing change in afternoon as OR finished around midnight.     Seen with thoracic surgery team.     Winnie Arriola MD  PGY-1, General Surgery  Pager: [phone]                       "

## 2017-06-07 NOTE — H&P
Thoracic Surgery H&P    2017    Patient Name: Otilia Mckee  Patient Age:  51 year old  Patient MRN: [id number]    History of Present Illness:  50 year old female with history of DM, CAD who presents as a transfer from [facility] for mediastinitis. Of note, history is limited as no OSH records available at this time and information is gathered from a combination of the patient, her  and the nursing sign out. She was reportedly admitted to the hospital around 102 weeks ago with pseudoseizures and was intubated. That hospital course was complicated by an NSTEMI for which she underwent stent placement (reportedly on ?). She was subsequently discharged from the hospital and then readmitted with shortness of breath/stridor. She was in the ICU for airway management as well as hypotension requiring pressors. She underwent a bronchoscopy on  which showed upper area mucus plugging and tracheal edema and plugging. She was then emergently intubated due to respiratory distress after removal of the scope. CT scan showed free air around the upper trachea and she was started on broad spectrum antibiotics (clindamycin, vancomycin and caspofungin) and transferred here.     Review of Systems: 10 point review of systems is negative except for where noted above.   Past Medical History:  Seizures, CAD s/p stent (, 2017), DMII, NSTEMI, pseudoseizure,   Past Surgical History:  , hysterectomy  Social History:  Unknown  Family History:  Unknow  No family history of bleeding disorders  No family history of adverse reactions to anesthesia  Allergies: Contrast    Medications: Complete medication list unknown, on aspirin and plavix    Physical Exam:  HR 60s SBP 120s  Laying comfortably in bed in no acute distress  Awake, alert and appropriate  Raspy voice  Non-labored breathing on nasal cannula  Regular rate and rhythm  Abdomen soft, nontender and not distended. Well healed lower midline incision  Extremities  warm, well perfused    Lab:  No new labs    Imaging:   CT from outside hospital reviewed- free air around upper trachea; no read available.     Assessment & Plan:  Otilia Mckee is a 51 year old female with mediastinal free air.   Plan for OR tonight for EGD, bronchoscopy, possible neck exploration, possible gastrostomy.   STAT labs ordered  Consent obtained, OR called    Seen and evaluated with Dr. Mccollum, fellow. Discussed with Dr. Sepulveda.     Winnie Arriola MD  General Surgery Resident  Pager: [phone]        STAFF ADDENDUM:  I saw and evaluated Ms. Mckee and agree with the resident s findings and plan of care as documented in the resident s note and edited by me, as applicable.      In summary, Ms. Mckee has mediastinitis and needs to go to the operating room urgently. I reviewed the indications, risks, and benefits and expected hospital course.  I spent a total of 40 minutes with Ms. Mckee, 35 of which were spent in counseling and coordination of care. The patient had all questions answered and was in agreement with the plan.  Jacky Sepulveda MD

## 2017-06-08 ENCOUNTER — APPOINTMENT (OUTPATIENT)
Dept: SPEECH THERAPY | Facility: CLINIC | Age: 52
DRG: 166 | End: 2017-06-08
Attending: THORACIC SURGERY (CARDIOTHORACIC VASCULAR SURGERY)
Payer: COMMERCIAL

## 2017-06-08 ENCOUNTER — APPOINTMENT (OUTPATIENT)
Dept: PHYSICAL THERAPY | Facility: CLINIC | Age: 52
DRG: 166 | End: 2017-06-08
Attending: THORACIC SURGERY (CARDIOTHORACIC VASCULAR SURGERY)
Payer: COMMERCIAL

## 2017-06-08 LAB
ANION GAP SERPL CALCULATED.3IONS-SCNC: 6 MMOL/L (ref 3–14)
BUN SERPL-MCNC: 24 MG/DL (ref 7–30)
CALCIUM SERPL-MCNC: 8.9 MG/DL (ref 8.5–10.1)
CHLORIDE SERPL-SCNC: 109 MMOL/L (ref 94–109)
CO2 SERPL-SCNC: 28 MMOL/L (ref 20–32)
CREAT SERPL-MCNC: 0.71 MG/DL (ref 0.52–1.04)
CRP SERPL-MCNC: 130 MG/L (ref 0–8)
CRP SERPL-MCNC: 130 MG/L (ref 0–8)
ERYTHROCYTE [DISTWIDTH] IN BLOOD BY AUTOMATED COUNT: 15.4 % (ref 10–15)
GFR SERPL CREATININE-BSD FRML MDRD: 86 ML/MIN/1.7M2
GLUCOSE BLDC GLUCOMTR-MCNC: 172 MG/DL (ref 70–99)
GLUCOSE BLDC GLUCOMTR-MCNC: 178 MG/DL (ref 70–99)
GLUCOSE BLDC GLUCOMTR-MCNC: 180 MG/DL (ref 70–99)
GLUCOSE BLDC GLUCOMTR-MCNC: 182 MG/DL (ref 70–99)
GLUCOSE BLDC GLUCOMTR-MCNC: 211 MG/DL (ref 70–99)
GLUCOSE BLDC GLUCOMTR-MCNC: 215 MG/DL (ref 70–99)
GLUCOSE SERPL-MCNC: 172 MG/DL (ref 70–99)
HBA1C MFR BLD: 10.5 % (ref 4.3–6)
HCT VFR BLD AUTO: 30.8 % (ref 35–47)
HGB BLD-MCNC: 9.7 G/DL (ref 11.7–15.7)
MAGNESIUM SERPL-MCNC: 2.3 MG/DL (ref 1.6–2.3)
MCH RBC QN AUTO: 27.2 PG (ref 26.5–33)
MCHC RBC AUTO-ENTMCNC: 31.5 G/DL (ref 31.5–36.5)
MCV RBC AUTO: 87 FL (ref 78–100)
PHOSPHATE SERPL-MCNC: 2 MG/DL (ref 2.5–4.5)
PLATELET # BLD AUTO: 279 10E9/L (ref 150–450)
POTASSIUM SERPL-SCNC: 3.4 MMOL/L (ref 3.4–5.3)
RBC # BLD AUTO: 3.56 10E12/L (ref 3.8–5.2)
SODIUM SERPL-SCNC: 143 MMOL/L (ref 133–144)
WBC # BLD AUTO: 15.4 10E9/L (ref 4–11)

## 2017-06-08 PROCEDURE — 83036 HEMOGLOBIN GLYCOSYLATED A1C: CPT | Performed by: STUDENT IN AN ORGANIZED HEALTH CARE EDUCATION/TRAINING PROGRAM

## 2017-06-08 PROCEDURE — 40000193 ZZH STATISTIC PT WARD VISIT

## 2017-06-08 PROCEDURE — 97116 GAIT TRAINING THERAPY: CPT | Mod: GP

## 2017-06-08 PROCEDURE — 25000128 H RX IP 250 OP 636: Performed by: STUDENT IN AN ORGANIZED HEALTH CARE EDUCATION/TRAINING PROGRAM

## 2017-06-08 PROCEDURE — 84100 ASSAY OF PHOSPHORUS: CPT | Performed by: STUDENT IN AN ORGANIZED HEALTH CARE EDUCATION/TRAINING PROGRAM

## 2017-06-08 PROCEDURE — 80048 BASIC METABOLIC PNL TOTAL CA: CPT | Performed by: STUDENT IN AN ORGANIZED HEALTH CARE EDUCATION/TRAINING PROGRAM

## 2017-06-08 PROCEDURE — 25000132 ZZH RX MED GY IP 250 OP 250 PS 637: Performed by: THORACIC SURGERY (CARDIOTHORACIC VASCULAR SURGERY)

## 2017-06-08 PROCEDURE — 92526 ORAL FUNCTION THERAPY: CPT | Mod: GN | Performed by: SPEECH-LANGUAGE PATHOLOGIST

## 2017-06-08 PROCEDURE — 40000225 ZZH STATISTIC SLP WARD VISIT: Performed by: SPEECH-LANGUAGE PATHOLOGIST

## 2017-06-08 PROCEDURE — 93005 ELECTROCARDIOGRAM TRACING: CPT

## 2017-06-08 PROCEDURE — 25000125 ZZHC RX 250: Performed by: SURGERY

## 2017-06-08 PROCEDURE — 86140 C-REACTIVE PROTEIN: CPT | Performed by: STUDENT IN AN ORGANIZED HEALTH CARE EDUCATION/TRAINING PROGRAM

## 2017-06-08 PROCEDURE — 25000132 ZZH RX MED GY IP 250 OP 250 PS 637: Performed by: STUDENT IN AN ORGANIZED HEALTH CARE EDUCATION/TRAINING PROGRAM

## 2017-06-08 PROCEDURE — 25000128 H RX IP 250 OP 636: Performed by: SURGERY

## 2017-06-08 PROCEDURE — 00000146 ZZHCL STATISTIC GLUCOSE BY METER IP

## 2017-06-08 PROCEDURE — 36592 COLLECT BLOOD FROM PICC: CPT | Performed by: STUDENT IN AN ORGANIZED HEALTH CARE EDUCATION/TRAINING PROGRAM

## 2017-06-08 PROCEDURE — 93010 ELECTROCARDIOGRAM REPORT: CPT | Performed by: INTERNAL MEDICINE

## 2017-06-08 PROCEDURE — 12000006 ZZH R&B IMCU INTERMEDIATE UMMC

## 2017-06-08 PROCEDURE — 97530 THERAPEUTIC ACTIVITIES: CPT | Mod: GP

## 2017-06-08 PROCEDURE — 97161 PT EVAL LOW COMPLEX 20 MIN: CPT | Mod: GP

## 2017-06-08 PROCEDURE — 25000131 ZZH RX MED GY IP 250 OP 636 PS 637: Performed by: PHYSICIAN ASSISTANT

## 2017-06-08 PROCEDURE — 40000275 ZZH STATISTIC RCP TIME EA 10 MIN

## 2017-06-08 PROCEDURE — 85027 COMPLETE CBC AUTOMATED: CPT | Performed by: STUDENT IN AN ORGANIZED HEALTH CARE EDUCATION/TRAINING PROGRAM

## 2017-06-08 PROCEDURE — 83735 ASSAY OF MAGNESIUM: CPT | Performed by: STUDENT IN AN ORGANIZED HEALTH CARE EDUCATION/TRAINING PROGRAM

## 2017-06-08 PROCEDURE — 25000125 ZZHC RX 250: Performed by: PHYSICIAN ASSISTANT

## 2017-06-08 RX ORDER — SENNOSIDES 8.6 MG
8.6 TABLET ORAL 2 TIMES DAILY
Status: DISCONTINUED | OUTPATIENT
Start: 2017-06-08 | End: 2017-06-09 | Stop reason: HOSPADM

## 2017-06-08 RX ORDER — POLYETHYLENE GLYCOL 3350 17 G/17G
17 POWDER, FOR SOLUTION ORAL DAILY
Status: DISCONTINUED | OUTPATIENT
Start: 2017-06-08 | End: 2017-06-09 | Stop reason: HOSPADM

## 2017-06-08 RX ORDER — ISOSORBIDE MONONITRATE 30 MG/1
30 TABLET, EXTENDED RELEASE ORAL DAILY
COMMUNITY

## 2017-06-08 RX ORDER — TOPIRAMATE 25 MG/1
25 TABLET, FILM COATED ORAL DAILY
Status: DISCONTINUED | OUTPATIENT
Start: 2017-06-08 | End: 2017-06-09 | Stop reason: HOSPADM

## 2017-06-08 RX ORDER — GABAPENTIN 600 MG/1
600 TABLET ORAL 3 TIMES DAILY
Status: DISCONTINUED | OUTPATIENT
Start: 2017-06-08 | End: 2017-06-09 | Stop reason: HOSPADM

## 2017-06-08 RX ORDER — PANTOPRAZOLE SODIUM 40 MG/1
40 TABLET, DELAYED RELEASE ORAL DAILY
Status: DISCONTINUED | OUTPATIENT
Start: 2017-06-09 | End: 2017-06-09 | Stop reason: HOSPADM

## 2017-06-08 RX ORDER — ASPIRIN 81 MG/1
81 TABLET, CHEWABLE ORAL DAILY
Status: DISCONTINUED | OUTPATIENT
Start: 2017-06-08 | End: 2017-06-09 | Stop reason: HOSPADM

## 2017-06-08 RX ORDER — ISOSORBIDE MONONITRATE 30 MG/1
30 TABLET, EXTENDED RELEASE ORAL DAILY
Status: DISCONTINUED | OUTPATIENT
Start: 2017-06-08 | End: 2017-06-09 | Stop reason: HOSPADM

## 2017-06-08 RX ADMIN — TOPIRAMATE 25 MG: 25 TABLET ORAL at 14:31

## 2017-06-08 RX ADMIN — AMPICILLIN SODIUM AND SULBACTAM SODIUM 3 G: 2; 1 INJECTION, POWDER, FOR SOLUTION INTRAMUSCULAR; INTRAVENOUS at 14:33

## 2017-06-08 RX ADMIN — AMPICILLIN SODIUM AND SULBACTAM SODIUM 3 G: 2; 1 INJECTION, POWDER, FOR SOLUTION INTRAMUSCULAR; INTRAVENOUS at 02:26

## 2017-06-08 RX ADMIN — INSULIN ASPART 2 UNITS: 100 INJECTION, SOLUTION INTRAVENOUS; SUBCUTANEOUS at 04:38

## 2017-06-08 RX ADMIN — GABAPENTIN 600 MG: 600 TABLET, FILM COATED ORAL at 20:30

## 2017-06-08 RX ADMIN — BUSPIRONE HYDROCHLORIDE 7.5 MG: 7.5 TABLET ORAL at 08:45

## 2017-06-08 RX ADMIN — MORPHINE SULFATE 2 MG: 2 INJECTION, SOLUTION INTRAMUSCULAR; INTRAVENOUS at 07:01

## 2017-06-08 RX ADMIN — AMPICILLIN SODIUM AND SULBACTAM SODIUM 3 G: 2; 1 INJECTION, POWDER, FOR SOLUTION INTRAMUSCULAR; INTRAVENOUS at 08:39

## 2017-06-08 RX ADMIN — AMPICILLIN SODIUM AND SULBACTAM SODIUM 3 G: 2; 1 INJECTION, POWDER, FOR SOLUTION INTRAMUSCULAR; INTRAVENOUS at 20:29

## 2017-06-08 RX ADMIN — MORPHINE SULFATE 2 MG: 2 INJECTION, SOLUTION INTRAMUSCULAR; INTRAVENOUS at 22:12

## 2017-06-08 RX ADMIN — ASPIRIN 81 MG CHEWABLE TABLET 81 MG: 81 TABLET CHEWABLE at 14:31

## 2017-06-08 RX ADMIN — PANTOPRAZOLE SODIUM 40 MG: 40 INJECTION, POWDER, FOR SOLUTION INTRAVENOUS at 08:44

## 2017-06-08 RX ADMIN — FLUCONAZOLE 200 MG: 2 INJECTION INTRAVENOUS at 08:45

## 2017-06-08 RX ADMIN — MORPHINE SULFATE 2 MG: 2 INJECTION, SOLUTION INTRAMUSCULAR; INTRAVENOUS at 14:16

## 2017-06-08 RX ADMIN — CHLORASEPTIC 1 ML: 1.5 LIQUID ORAL at 04:40

## 2017-06-08 RX ADMIN — BUSPIRONE HYDROCHLORIDE 7.5 MG: 7.5 TABLET ORAL at 20:30

## 2017-06-08 RX ADMIN — CLOPIDOGREL 75 MG: 75 TABLET, FILM COATED ORAL at 08:45

## 2017-06-08 RX ADMIN — PEDIATRIC MULTIPLE VITAMIN W/ MINERALS & C CHEW TAB 60 MG 1 TABLET: CHEW TAB at 14:32

## 2017-06-08 RX ADMIN — INSULIN GLARGINE 22 UNITS: 100 INJECTION, SOLUTION SUBCUTANEOUS at 08:46

## 2017-06-08 RX ADMIN — MORPHINE SULFATE 2 MG: 2 INJECTION, SOLUTION INTRAMUSCULAR; INTRAVENOUS at 02:22

## 2017-06-08 RX ADMIN — MORPHINE SULFATE 2 MG: 2 INJECTION, SOLUTION INTRAMUSCULAR; INTRAVENOUS at 00:26

## 2017-06-08 RX ADMIN — INSULIN ASPART 2 UNITS: 100 INJECTION, SOLUTION INTRAVENOUS; SUBCUTANEOUS at 08:46

## 2017-06-08 RX ADMIN — GABAPENTIN 600 MG: 600 TABLET, FILM COATED ORAL at 14:31

## 2017-06-08 RX ADMIN — ISOSORBIDE MONONITRATE 30 MG: 30 TABLET, EXTENDED RELEASE ORAL at 14:31

## 2017-06-08 RX ADMIN — ENOXAPARIN SODIUM 40 MG: 40 INJECTION SUBCUTANEOUS at 08:45

## 2017-06-08 RX ADMIN — INSULIN ASPART 4 UNITS: 100 INJECTION, SOLUTION INTRAVENOUS; SUBCUTANEOUS at 00:21

## 2017-06-08 RX ADMIN — MORPHINE SULFATE 2 MG: 2 INJECTION, SOLUTION INTRAMUSCULAR; INTRAVENOUS at 04:41

## 2017-06-08 RX ADMIN — DULOXETINE 60 MG: 60 CAPSULE, DELAYED RELEASE ORAL at 22:01

## 2017-06-08 RX ADMIN — INSULIN ASPART 3 UNITS: 100 INJECTION, SOLUTION INTRAVENOUS; SUBCUTANEOUS at 14:33

## 2017-06-08 ASSESSMENT — PAIN DESCRIPTION - DESCRIPTORS
DESCRIPTORS: ACHING

## 2017-06-08 NOTE — PROGRESS NOTES
" 06/08/17 0945   Quick Adds   Type of Visit Initial PT Evaluation      Language English   Living Environment   Lives With spouse   Living Arrangements apartment   Home Accessibility bed and bath on same level;grab bars present (bathtub);tub/shower is not walk in   Number of Stairs to Enter Home 0  (live on main level)   Number of Stairs Within Home 0   Transportation Available car;family or friend will provide   Living Environment Comment Pt lives in apartment with  and 2 dogs. Pt is from [place] and has limited availability for places to stay while in [place].  and pt no longer working and neighbors available to assist as needed. Pt reporting she quit smoking ~1 month ago and plans to \"never start that again\" and is eager to begin a better renée journey, per report.    Self-Care   Dominant Hand right   Usual Activity Tolerance moderate   Current Activity Tolerance fair   Regular Exercise no   Equipment Currently Used at Home none   Activity/Exercise/Self-Care Comment Pt was IND in all self-cares prior to admission. Pt reports use of grab bars in bathroom at home, but no equipment used usually.    Functional Level Prior   Ambulation 0-->independent   Transferring 0-->independent   Toileting 0-->independent   Bathing 0-->independent   Dressing 0-->independent   Cognition 0 - no cognition issues reported   Fall history within last six months no   Which of the above functional risks had a recent onset or change? ambulation;transferring;swallowing   Prior Functional Level Comment Pt was IND in all ADLs and IADLs prior to admission. Pt reports no issues with ambulation/ADLs prior to recent admissions.    General Information   Onset of Illness/Injury or Date of Surgery - Date 06/07/17  (date of PT orders)   Referring Physician Josue Ventura MD   Patient/Family Goals Statement Pt reporting goal to eat healthier and continue quitting smoking.    Pertinent History of Current Problem (include personal " factors and/or comorbidities that impact the POC) Pt is a 51-year-old female with findings concerning for free air around upper trachea (from OSH CT) and mediastinitis now POD #1 s/p Bronchoscopy with I&D of mediastinal abscess with drain placement. Patient has significant history of NSTEMI <2 weeks ago with stent placement on plavix/ASA  - per resident note   Precautions/Limitations fall precautions   General Observations Pt sitting up at EOB, agreeable to session, R IJ triple lumen, dressing over L masoud-lateral neck.    General Info Comments Activity: up ad gianluca   Cognitive Status Examination   Orientation orientation to person, place and time   Level of Consciousness alert   Follows Commands and Answers Questions 100% of the time;able to follow multistep instructions   Personal Safety and Judgment intact   Memory intact   Pain Assessment   Patient Currently in Pain (intermittently per report)   Integumentary/Edema   Integumentary/Edema Comments generalized edema - non pitting   Posture    Posture Forward head position;Protracted shoulders  (reduced lumbar lordosis)   Range of Motion (ROM)   ROM Comment B UE/LE WFL   Strength   Strength Comments B UE WLF. B LE grossly impaired, demonstrating at most 4/5 for proximal B LE strength as noted with functional mobility. Pt reports decline in strength over past few weeks.    Bed Mobility   Bed Mobility Comments Pt demo at MOD I   Transfer Skills   Transfer Comments Pt demo at CGA for sit <> stand and pivot transfers   Gait   Gait Comments Pt ambulating 10ft at strong CGA, downward gaze, wide ROMEO,  B UE on IV pole, flexed trunk posture, very slowed gait speed. Pt on RA throughout.    Balance   Balance Comments Sitting balance fair - SBA for reaching. Standing balance poor - CGA for dynamic activites to min A for reaching   Sensory Examination   Sensory Perception Comments Pt reporting baseline peripheral neuropathy in B hands and feet, which is unchanged since admission.  "   General Therapy Interventions   Planned Therapy Interventions balance training;gait training;neuromuscular re-education;ROM;strengthening;stretching;transfer training;risk factor education;home program guidelines;progressive activity/exercise   Clinical Impression   Criteria for Skilled Therapeutic Intervention yes, treatment indicated   PT Diagnosis Impaired functional mobility   Influenced by the following impairments Imparied activity tolerance, impaired proximal B LE strength, impaired balance, pain,    Functional limitations due to impairments Decreased IND with ALDs and IADLs, limiting participation within the community.    Clinical Presentation Stable/Uncomplicated   Clinical Presentation Rationale on RA, below baseline mobility,  to support, NSTEMI <2 weeks ago, hx DM   Clinical Decision Making (Complexity) Low complexity   Therapy Frequency` 5 times/week   Predicted Duration of Therapy Intervention (days/wks) 1 week   Anticipated Equipment Needs at Discharge (none anticipated at this time)   Anticipated Discharge Disposition Home with Outpatient Therapy  (OP cardiac rehab)   Risk & Benefits of therapy have been explained Yes   Patient, Family & other staff in agreement with plan of care Yes   [facility] [employer] TM \"6 Clicks\"   2016, Trustees of [facility], under license to [employer].  All rights reserved.   6 Clicks Short Forms Basic Mobility Inpatient Short Form   [facility] AM-PAC  \"6 Clicks\" V.2 Basic Mobility Inpatient Short Form   1. Turning from your back to your side while in a flat bed without using bedrails? 4 - None   2. Moving from lying on your back to sitting on the side of a flat bed without using bedrails? 4 - None   3. Moving to and from a bed to a chair (including a wheelchair)? 3 - A Little   4. Standing up from a chair using your arms (e.g., wheelchair, or bedside chair)? 3 - A Little   5. To walk in hospital room? 3 - A Little   6. Climbing 3-5 steps with " a railing? 2 - A Lot   Basic Mobility Raw Score (Score out of 24.Lower scores equate to lower levels of function) 19   Total Evaluation Time   Total Evaluation Time (Minutes) 19

## 2017-06-08 NOTE — PROGRESS NOTES
SPIRITUAL HEALTH SERVICES  SPIRITUAL ASSESSMENT Progress Note  [facility] ([place]) U6B   ON-CALL VISIT    REFERRAL SOURCE: Paged by nurse and informed that Otilia was ready to speak with a .    Outcome:  Upon entering the room Otilia was emotionally upset and crying.  Her  was present and asked if there was any information about food vouchers, gas cards,and parking pass to get his care out of parking.  I informed him that what I know is that the hospital does not have a program that I am aware of for this type of support.  I asked if they have asked to speak with the SW who might be able to help out with suggestions.  I spoke with the bedside nurse and asked if she could inform the SW of their request and speak with them about this issue.  The bedside nurse said that she would page the SW for tomorrow.  I spoke with the nurse again once back in my office as it felt/gut reaction that I was paged for Otilia and her  was doing the speaking and asking for assistance.          PLAN: SHS will be available for Otilia for the duration of her stay in hospital.    Arturo Garcia  Chaplain Resident  Pager [phone]

## 2017-06-08 NOTE — PROGRESS NOTES
Patient still has IJ in. CPAP mask would have to be adequately enough to maintain the pressure. Advised RN that we use a NC overnight for patient for now.

## 2017-06-08 NOTE — CONSULTS
"Diabetes/Hyperglycemia Management Consult    Chief Complaint glycemic management  Consult requested by: Dr. Ventura  History of Present Illness Otilia Mckee is a 51 year old female with history of type 2 diabetes, hypertension, hyperlipidemia, depression CAD ( s/p stent 2/2016 and 2017, on Plavix) who presented from [facility] as a transfer on (6/6/2017) for mediastinitis s/p neck exploration and abscess drainage    Ms. Mckee reports she was dx with Type 2 diabetes approximately 10 years ago. She initially was on Metformin 1000 mg daily Humalog fixed dose. She  was transitioned to V-Go 40 pump approximately 3 months ago. Was testing glucose 4 x daily and taking \"4 clicks\" with meals off her pump. Reports she did not believe her pump was working well, because her glucose still in the 500-600 range.     Glucose on transfer, 184, ( ? If was on lantus dose for OSH) was given dexamethasone 10 mg (6/6, at 2241) during surgery, with elevated blood sugars. Started on correction insuiln  Lantus 22 units was started (6/7), along with correction insulin. Glucose high 200-300 range   Per primary team, initial plan was to place feeding tube and start TF, because Ms. Mckee had failed her  swallow study.   Now, Ms. Mckee has passed her swallow study. Diet has been advanced with calorie counts      Currently, denies fever, chills, chest pain, shortness of breath, abdominal pain,  nausea and or vomiting, bowel or bladder issues.Tolerated oral diet. Is wanting to improve so she may return to [place]      Per review of Care Everywhere and per Ms. Mckee, was hospitalized at [facility] from  5/26 -5/28/2017 for seizure dx with pseudoseizures, discharged  Admitted to [facility] from 5/30-6/1/2017, for post extubation laryngeal injury, NSTEMI s/p stenting. Diabetes poorly controlled with HGB A1C 11.7% (5/26/2017). Insulin pump was not resumed, was started on Lantus 45 units HS, Humalog 12 units with meals and " "correction scale 1 unit per 50 > 140 before meals and > 200 HS  Readmitted 6/2 for shortness of breath/stridor.      Recent Labs  Lab 06/08/17  0810 06/08/17  0549 06/08/17  0433 06/08/17  0017 06/07/17  1918 06/07/17  1631 06/07/17  1130  06/07/17  0321  06/06/17  2305 06/06/17  2104   GLC  --  172*  --   --   --   --   --   --  246*  --  192* 184*   *  --  182* 215* 231* 273* 307*  < >  --   < >  --   --    < > = values in this interval not displayed.      Diabetes Type: Type 2 diabetes  Diabetes Duration: ~ 10 years ago  Usual Diabetes Regimen: V-Go 40 pump, humalog, metformin 500 mg bid  Ability to [place] Prescribed Regimen: not by evidence of HGB a1C  Diabetes Control:   Lab Results   Component Value Date    A1C 10.5 06/08/2017     Diabetes Complications: CAD, peripheral neuropathy  Able to Detect Hypoglycemia: not experienced hypoglycemia for awhile  Usual Diabetes Care Provider: Endocrinologist in [place]  Factors Impacting Glucose Control: infection, stress      Review of Systems  10 point ROS completed with pertinent positives and negatives noted in the HPI    Past medical, family and social histories are reviewed and updated.    Past Medical History  Type 2 diabets  Hypertension  Hyperlipidemia  CAD s/p stents  Depression  GERD    Family History  Family hx specific for diabetes on fathers side of the family    Social History  Social History     Social History     Marital status:      Spouse name: N/A     Number of children: N/A     Years of education: N/A     Social History Main Topics     Smoking status: Not on file     Smokeless tobacco: Not on file     Alcohol use Not on file     Drug use: Not on file     Sexual activity: Not on file     Other Topics Concern     Not on file     Social History Narrative     No narrative on file         Physical Exam  /65 (BP Location: Left arm)  Temp 98.4  F (36.9  C) (Oral)  Resp 16  Ht 1.651 m (5' 5\")  Wt 97.5 kg (215 lb)  SpO2 95%  BMI 35.78 " kg/m2    General:  pleasant , resting in bed, in no distress.   HEENT:NC/AT, PER and anicteric, non-injected, oral mucous membranes moist.bulky dressing to left side of trachea   Lungs: unlabored  ABD: soft,   Skin: warm and dry, no jaundice  MSK: moving all extremities  Mental status:  alert, oriented x3, communicating clearly  Psych:  calm, even mood    Laboratory  Recent Labs   Lab Test  06/08/17   0549  06/07/17   0321   NA  143  143   POTASSIUM  3.4  4.4   CHLORIDE  109  110*   CO2  28  24   ANIONGAP  6  9   GLC  172*  246*   BUN  24  23   CR  0.71  0.74   TYLER  8.9  8.1*     CBC RESULTS:   Recent Labs   Lab Test  06/08/17   0549   WBC  15.4*   RBC  3.56*   HGB  9.7*   HCT  30.8*   MCV  87   MCH  27.2   MCHC  31.5   RDW  15.4*   PLT  279       Liver Function Studies - No results for input(s): PROTTOTAL, ALBUMIN, BILITOTAL, ALKPHOS, AST, ALT, BILIDIRECT in the last 99193 hours.    Active Medications  Current Facility-Administered Medications   Medication     phenol (CHLORASEPTIC) 1.4 % spray 1 mL     sennosides (SENOKOT) syrup 5 mL     polyethylene glycol (MIRALAX/GLYCOLAX) Packet 17 g     naloxone (NARCAN) injection 0.1-0.4 mg     albuterol neb solution 2.5 mg     ondansetron (ZOFRAN-ODT) ODT tab 4 mg    Or     ondansetron (ZOFRAN) injection 4 mg     glucose 40 % gel 15-30 g    Or     dextrose 50 % injection 25-50 mL    Or     glucagon injection 1 mg     insulin aspart (NovoLOG) inj (RAPID ACTING)     potassium chloride SA (K-DUR/KLOR-CON M) CR tablet 20-40 mEq     potassium chloride (KLOR-CON) Packet 20-40 mEq     potassium chloride 10 mEq in 100 mL intermittent infusion     potassium chloride 10 mEq in 100 mL intermittent infusion with 10 mg lidocaine     potassium chloride 20 mEq in 50 mL intermittent infusion     magnesium sulfate 4 g in 100 mL sterile water (premade)     potassium phosphate 15 mmol in D5W 250 mL intermittent infusion     potassium phosphate 20 mmol in D5W 500 mL intermittent infusion      potassium phosphate 20 mmol in D5W 250 mL intermittent infusion     potassium phosphate 25 mmol in D5W 500 mL intermittent infusion     fluconazole (DIFLUCAN) intermittent infusion 200 mg in NaCl     enoxaparin (LOVENOX) injection 40 mg     pantoprazole (PROTONIX) 40 mg IV push injection     insulin glargine (LANTUS) injection 22 Units     ampicillin-sulbactam (UNASYN) 3 g vial to attach to  mL bag     clopidogrel (PLAVIX) tablet 75 mg     DULoxetine (CYMBALTA) EC capsule 60 mg     nitroglycerin (NITROSTAT) sublingual tablet 0.4 mg     busPIRone (BUSPAR) tablet 7.5 mg     morphine (PF) injection 2-4 mg     aspirin Suppository 300 mg     lidocaine (XYLOCAINE) 2 % solution 5 mL     0.45% sodium chloride infusion     dextrose 10 % 1,000 mL infusion     multivitamins with minerals (CERTAVITE/CEROVITE) liquid 15 mL     No current outpatient prescriptions on file.       Current Diet    Active Diet Order      Full Liquid Diet      Assessment: Otilia Mceke is a 51 year old female with history of type 2 diabetes, hypertension, hyperlipidemia, depression CAD ( s/p stent 2/2016 and 2017, on Plavix) who presented from [facility] as a transfer on (6/6/2017) for mediastinitis s/p neck exploration and abscess drainage    Type 2 diabetic:  poorly controlled with HGB A1C 10.5%  Blood sugars are improving as dexamethasone is wearing off,   Is missing meal insulin, will add  Plan  -continue with lantus 22 units ( weight based 24 units)  - aspart 1 unit per 10 grams of CHO with meals and snacks  -aspart medium correction, d/c  -Aspart 1 unit per 35 > 140 before meals and > 200 HS  monitor glucose before meals, HS and 0200    Will continue to follow    Cathy CASTORENA -[phone]  Diabetes Management Team job code: [id number]

## 2017-06-08 NOTE — PROGRESS NOTES
SPIRITUAL HEALTH SERVICES  SPIRITUAL ASSESSMENT Progress Note  [facility] ([place]) 6B    Otilia was resting and told me her troat hurt and she could not talk. I asked her to greet her  for me when she saw him and assured her I could return tomorrow.       Sanjeev Fields  Chaplain Resident  Pager [phone]

## 2017-06-08 NOTE — PLAN OF CARE
Problem: Goal Outcome Summary  Goal: Goal Outcome Summary  OT-6B: Cancel and defer OT evaluation. Per discussion with PT, pt has no skilled OT needs at this time. PT to address current needs. Will complete OT order. Please re-order if new needs arise.

## 2017-06-08 NOTE — PROVIDER NOTIFICATION
" notified that Pt reported chest pain that penetrates from front of chest through to her back following coughing that is new and different from the neck/ throat pain that she had previously reported. Pt received PRN morphine 2mg . /77, HR 56, PO2 96% on 1L. EKG ordered.     Dr. Gracia notified 0254 of abnomal EKG  \"When compared with ECG of 07-JUN-2017 01:06, Nonspecific T wave abnormality, worse in Anterolateral leads\" EKG reviewed and found to be consistent with prior EKG findings.   "

## 2017-06-08 NOTE — OP NOTE
REVISED REPORT      DATE OF SERVICE:  06/06/2017      PREOPERATIVE DIAGNOSIS:  Mediastinal phlegmon.      PROCEDURES PERFORMED:   1.  Flexible bronchoscopy.   2.  Left neck dissection with drainage of pretracheal phlegmon.      SURGEON:  Jacky Sepulveda MD (present and participated in the entire procedure).      RESIDENT SURGEON:  Seamus Mccollum MD      ANESTHESIA:  General.      ESTIMATED BLOOD LOSS:  30 mL      COMPLICATIONS:  None immediate.      FINDINGS:  The pretracheal plane was very thickened and difficult to dissect, but once we entered it, it drained a scant amount of murky fluid that we sent for cultures.      DESCRIPTION OF PROCEDURE:  With Otilia Garcia in supine position under general anesthesia, we performed a flexible bronchoscopy and pulled the endotracheal tube back into the subglottic space.  We could see some fibrinous debris on the mucosa where presumably the cuff of the ET tube had been earlier, but really very minimal inflammation and no evidence of stricture.  We then repositioned the endotracheal tube and secured it.  The neck and chest were prepared and draped in the conventional fashion and we made a 10 cm incision going past the midline towards the left in a natural crease.  We entered the prevertebral plane and dissected behind the esophagus and could not identify any abnormalities and then we proceeded with the pretracheal dissection which was challenging, but we entered the pretracheal space finally and were able to drain a small amount of murky fluid.  There was really no other evidence of abscess.  We stayed away from the tracheoesophageal groove at all times to prevent nerve injury, particularly since there was no evidence of anything suggestive of phlegmon between the trachea and the esophagus.      We then irrigated copiously, placed a Penrose into the mediastinum and packed the wound open.      The patient tolerated the procedure well and was extubated in the operating room.       Revised:  2017, thomas YEBOAH MD             D: 2017 18:43   T: 2017 07:07   MT: [email or web address]      Name:     ANGI GUNDERSON   MRN:      [id number]        Account:        [id number]   :      1965           Procedure Date: 2017      Document: [id number]       cc: [facility] Surgery Billing

## 2017-06-08 NOTE — PROGRESS NOTES
CLINICAL NUTRITION SERVICES - BRIEF NOTE     Nutrition Prescription    RECOMMENDATIONS FOR MDs/PROVIDERS TO ORDER:  - Encourage PO intakes, monitor need for TF    Recommendations already ordered by Registered Dietitian (RD):  - DC TF order at this time.   - Will order calorie counts 6/8-6/10    Future/Additional Recommendations:  1. If pt is unable to meet 75% of lower end needs through PO intakes, would recommend placing TF. Once FT placed/placement confirmed and okay to begin TF per per provider, rec start Impact Peptide 1.5 (immune-modulating) @ 15 mL/hr and adv by 10 mL q8h as tolerated to goal Impact Peptide @ goal 45 ml/hr (1080 ml/day) to provide 1620 kcals, 102 g PRO, 832 ml free H2O, 69 g Fat (50% from MCTs), 151 g CHO and no Fiber daily.   -- Do not start or adv TF unless K+/Mg++ >/= nrml and phos >1.9. Monitor lytes daily while adv to goal TF to watch for signs of refeeding    -- Baseline CRP and weekly monitoring to assess ongoing need for  immune-modulating TF formula vs consider switch to more maintenance formula  -- 30 mL q4h free water flushes for FT patency  -- Ordered multivitamin with minerals (Certavite, 15 mL/day) to help meet micronutrient needs with potential for TF interruptions / inadequate PO intakes   2. If appropriate to switch to a more maintenance formula, would rec goal Isosource 1.5 @ goal 45 ml/hr (1080 ml/day) to provide 1620 kcals, 73 g PRO, 821 ml free H2O, 190 g CHO and 16 g Fiber daily.   -- Would recommend additional 4 pkts Beneprotein (2 pkts BID for additional 100 kcal and 24 g PRO) so TF + beneprotein would provide 1720 kcal (25 kcal/kg) and 97 g PRO (1.4 g/kg)     -- Pt passed swallow study per SLP, TF was supposed to be placed on ICC by RD yesterday 6/7, however pt was transferred to  and TF to be placed by radiology. However TF will not be placed at this time due to adv of diet order.   -- Spoke with MD about ongoing POC  -- Will continue to monitor PO intakes through  Calorie counts    Liliana Ruiz RD, MS, LD  6B- Pager: [phone]

## 2017-06-08 NOTE — PROGRESS NOTES
Transfer  Transferred from: 4A  Via: bed  Reason for transfer: Pt appropriate for 6B- improved patient condition  Family: Aware of transfer, alongside pt  Belongings: Received with pt  Chart: Received with pt  Medications: Meds received from old unit with pt  2 RN Skin Assessment Completed By: Chelsy FAN and Belle FAN  Report received from: Vianney FAN  Pt status: VSS on 2L NC

## 2017-06-08 NOTE — PHARMACY-ADMISSION MEDICATION HISTORY
"Admission medication history interview status for the 6/6/2017 admission is complete. See Epic admission navigator for allergy information, pharmacy, prior to admission medications and immunization status.     Medication history interview sources:  Patient    Changes made to PTA medication list (reason)  Added: Insulin pump (with humalog), Lantus, metformin, isosorbide  Deleted: Acetaminophen 650mg suppository, acetaminophen 650mg PO, chloraseptic lozenge, bisacodyl 10mg suppository, clindamycin 900mg IV, furosemide 20mg PO, heparin 5000 units IV TID, hydromorphone injection prn, lorazepam IV PRN, ondansetron 4mg ODT, oxycodone PO prn, miralax prn, racepinephrine 2.25% neb solution prn, senokot-s prn  Changed: Aspirin changed from 300mg suppository to 80mg tablet.     Additional medication history information (including reliability of information, actions taken by pharmacist):    1. Many medications were deleted as they were only during previous hospital stay before transfer and not at home medications.   2. Patient has an insulin pump that she said was stopped at her previous hospital stay. She seemed foggy on the details, but was able to tell me she takes 40 units of lantus daily and continuous humalog with \"4 clicks\" before each meal (unable to tell how many units were each click).   3. Has not had to use nitrostat in over 1 month; she always carries it with her.   4. Patient discontinued ranexa 1 week ago per her cardiologist.   5. Patient has a CPAP machine at home.       Prior to Admission medications    Medication Sig Last Dose Taking? Auth Provider   Insulin Aspart (INSULIN PUMP - OUTPATIENT)   Yes Unknown, Entered By History   insulin glargine (LANTUS) 100 UNIT/ML injection Inject 40 Units Subcutaneous At Bedtime  Yes Unknown, Entered By History   METFORMIN HCL PO Take 1,000 mg by mouth 2 times daily (with meals)  Yes Unknown, Entered By History   ASPIRIN PO Take 81 mg by mouth daily   Yes Unknown, Entered " By History   isosorbide mononitrate (IMDUR) 30 MG 24 hr tablet Take 30 mg by mouth daily  Yes Unknown, Entered By History   BUSPIRONE HCL PO Take 7.5 mg by mouth 2 times daily 6/5/2017 at 2008 Yes Unknown, Entered By History   CLOPIDOGREL BISULFATE PO Take 75 mg by mouth daily 6/5/2017 at 0832 Yes Unknown, Entered By History   DULOXETINE HCL PO Take 60 mg by mouth At Bedtime 6/5/2017 at 2008 Yes Unknown, Entered By History   GABAPENTIN PO Take 600 mg by mouth 3 times daily 6/5/2017 at 2200 Yes Unknown, Entered By History   loratadine (CLARITIN) 10 MG tablet Take 10 mg by mouth daily 6/5/2017 at 0833 Yes Unknown, Entered By History   Nitroglycerin (NITROSTAT SL) Place 0.4 mg under the tongue every 5 minutes as needed for chest pain  Yes Unknown, Entered By History   PANTOPRAZOLE SODIUM PO Take 40 mg by mouth daily 6/6/2017 Yes Unknown, Entered By History   ROSUVASTATIN CALCIUM PO Take 20 mg by mouth daily 6/5/2017 at 0834 Yes Unknown, Entered By History   TOPIRAMATE PO Take 25 mg by mouth daily  6/5/2017 at 0834 Yes Unknown, Entered By History         Medication history completed by: Rosaline Booker, PharmD Student

## 2017-06-08 NOTE — PLAN OF CARE
Problem: Goal Outcome Summary  Goal: Goal Outcome Summary  Neuro: A&Ox4.   Cardiac: Sinus Bradycardia. VSS. Pt reported new chest pain that penetrated from front to back, EKG ordered, not remarkable from prior EKGs. BP stable, HR 70's with activity.                 Respiratory: Sating  97 % on 1L NC when sleeping, RA up to bathroom. One episode of apnea with desaturation and bradycardia, improved with arousal and increased O2, back to baseline within 4 min. No further interventions needed. IPI between 7-10 since.   GI/: Adequate urine output.   Diet/appetite: NPO, oral swabs. No Post pyloric tube placement yet. MIV fluid continued.  Activity:  Assist of 1, up ad gianluca to chair and bathroom.   Pain: At acceptable level on current regimen. Reported neck and chest pain worsened with cough, throat spray and PRN morphine given with improvement.   Skin: Intact, no new deficits noted, dressing reinforced. Monitored for signs of swelling of neck.   Voice more horse and quiet since start of shift.      R: Continue with POC. Notify primary team with changes.

## 2017-06-08 NOTE — PROGRESS NOTES
Thoracic surgery progress note    Some neck/chest pain overnight - EKG wnl. Also, an episode of apnea - on CPAP at home. This morning pain controlled.     Temp: 97.7  F (36.5  C) Temp  Min: 97.7  F (36.5  C)  Max: 98.8  F (37.1  C)  Resp: 12 Resp  Min: 12  Max: 22  SpO2: 97 % SpO2  Min: 92 %  Max: 99 %    No Data Recorded  Heart Rate: 57 Heart Rate  Min: 57  Max: 78  BP: 155/84 Systolic (24hrs), Av , Min:124 , Max:155   Diastolic (24hrs), Av, Min:75, Max:94    A&O, NAD  Unlabored breathing on RA  Wound clean and dry w/ penrose drain in place - repacked  RRR    I&O  UOP 1055/300    A/P: 51F w/ multiple medical co-morbidities s/p neck exploration and abscess drainage    - Pain control  - Will order home CPAP for TANIA  - Home aspirin and plavix  - NPO. Daily swallow eval. Ok for crushed meds. MIVF. Bowel regimen. IR feeding tube today  - C/w unsasyn and fluconazole  - Will consult endo for uncontrolled diabetes  - PT/OT/SLP  - Lovenox, GI ppx    Seen w/ thoracic team,    Josue Ventura MD  Surgery PGY1  [phone]

## 2017-06-08 NOTE — PLAN OF CARE
Problem: Goal Outcome Summary  Goal: Goal Outcome Summary  PT 6B: Evaluation complete and treatment indicated. Pt presenting with generalized deconditioning, proximal weakness, and impaired dynamic balance. Pt performing bed mobility MOD I, sit <> stand CGA, gait for ~100ft at CGA with B UE on IV pole. PT recommending discharge to home with assist from  as needed and OP cardiac rehab secondary to recent NSTEMI.

## 2017-06-08 NOTE — PLAN OF CARE
Problem: Goal Outcome Summary  Goal: Goal Outcome Summary  SLP: Pt seen bedside for dysphagia f/u.  Pt reported increased ease of swallow and sensation of being hungry.  Pt exhibiting improved tolerance of modified items this date.  Recommend advance diet to full liquid, nectar thick consistencies.  Pt will need to be upright for intake and should take small bites/sips at a slow pace while alternating consistencies.  ST to follow.

## 2017-06-08 NOTE — PLAN OF CARE
Problem: Goal Outcome Summary  Goal: Goal Outcome Summary  Outcome: Improving  Pt arrived to unit from 4A this shift. A&Ox4. VSS on 1L NC. C/o generalized neck and chest pain from coughing. PRN IV Morphine given with effectiveness. MIVF at 50 mL/hr. Neck penrose drain open. Dressing with moderate serosanguinous drainage. Up with 1 assist to BR. Voiding without difficulty. BGs Q4H, elevated, SSI given per orders. Plan for ND tube placement tomorrow in IR with start of tube feeds.  at bedside, supportive.  paged for support regarding social/financial situation. Spouse has lost job and their home is in [place]. 6B LSW to also see pt tomorrow to help direct to services, if any. Continue with POC and notify MD of any changes.

## 2017-06-09 ENCOUNTER — APPOINTMENT (OUTPATIENT)
Dept: CT IMAGING | Facility: CLINIC | Age: 52
DRG: 166 | End: 2017-06-09
Attending: THORACIC SURGERY (CARDIOTHORACIC VASCULAR SURGERY)
Payer: COMMERCIAL

## 2017-06-09 ENCOUNTER — APPOINTMENT (OUTPATIENT)
Dept: SPEECH THERAPY | Facility: CLINIC | Age: 52
DRG: 166 | End: 2017-06-09
Attending: THORACIC SURGERY (CARDIOTHORACIC VASCULAR SURGERY)
Payer: COMMERCIAL

## 2017-06-09 VITALS
HEIGHT: 65 IN | TEMPERATURE: 97.9 F | SYSTOLIC BLOOD PRESSURE: 140 MMHG | OXYGEN SATURATION: 95 % | DIASTOLIC BLOOD PRESSURE: 65 MMHG | WEIGHT: 215.7 LBS | BODY MASS INDEX: 35.94 KG/M2 | RESPIRATION RATE: 18 BRPM

## 2017-06-09 LAB
ERYTHROCYTE [DISTWIDTH] IN BLOOD BY AUTOMATED COUNT: 15 % (ref 10–15)
GLUCOSE BLDC GLUCOMTR-MCNC: 141 MG/DL (ref 70–99)
GLUCOSE BLDC GLUCOMTR-MCNC: 191 MG/DL (ref 70–99)
HCT VFR BLD AUTO: 29.1 % (ref 35–47)
HGB BLD-MCNC: 9.1 G/DL (ref 11.7–15.7)
INTERPRETATION ECG - MUSE: NORMAL
MCH RBC QN AUTO: 27.2 PG (ref 26.5–33)
MCHC RBC AUTO-ENTMCNC: 31.3 G/DL (ref 31.5–36.5)
MCV RBC AUTO: 87 FL (ref 78–100)
PLATELET # BLD AUTO: 267 10E9/L (ref 150–450)
RADIOLOGIST FLAGS: ABNORMAL
RBC # BLD AUTO: 3.35 10E12/L (ref 3.8–5.2)
WBC # BLD AUTO: 9.9 10E9/L (ref 4–11)

## 2017-06-09 PROCEDURE — 92526 ORAL FUNCTION THERAPY: CPT | Mod: GN | Performed by: SPEECH-LANGUAGE PATHOLOGIST

## 2017-06-09 PROCEDURE — 71250 CT THORAX DX C-: CPT

## 2017-06-09 PROCEDURE — 85027 COMPLETE CBC AUTOMATED: CPT | Performed by: STUDENT IN AN ORGANIZED HEALTH CARE EDUCATION/TRAINING PROGRAM

## 2017-06-09 PROCEDURE — 25000132 ZZH RX MED GY IP 250 OP 250 PS 637: Performed by: THORACIC SURGERY (CARDIOTHORACIC VASCULAR SURGERY)

## 2017-06-09 PROCEDURE — 70490 CT SOFT TISSUE NECK W/O DYE: CPT

## 2017-06-09 PROCEDURE — 00000146 ZZHCL STATISTIC GLUCOSE BY METER IP

## 2017-06-09 PROCEDURE — 40000225 ZZH STATISTIC SLP WARD VISIT: Performed by: SPEECH-LANGUAGE PATHOLOGIST

## 2017-06-09 PROCEDURE — 25000125 ZZHC RX 250: Performed by: PHYSICIAN ASSISTANT

## 2017-06-09 PROCEDURE — 36592 COLLECT BLOOD FROM PICC: CPT | Performed by: STUDENT IN AN ORGANIZED HEALTH CARE EDUCATION/TRAINING PROGRAM

## 2017-06-09 PROCEDURE — 25000132 ZZH RX MED GY IP 250 OP 250 PS 637: Performed by: SURGERY

## 2017-06-09 PROCEDURE — 25000128 H RX IP 250 OP 636: Performed by: SURGERY

## 2017-06-09 PROCEDURE — 25000132 ZZH RX MED GY IP 250 OP 250 PS 637: Performed by: STUDENT IN AN ORGANIZED HEALTH CARE EDUCATION/TRAINING PROGRAM

## 2017-06-09 RX ORDER — OXYCODONE HCL 5 MG/5 ML
5 SOLUTION, ORAL ORAL EVERY 4 HOURS PRN
Status: DISCONTINUED | OUTPATIENT
Start: 2017-06-09 | End: 2017-06-09 | Stop reason: HOSPADM

## 2017-06-09 RX ORDER — CLINDAMYCIN HCL 300 MG
300 CAPSULE ORAL EVERY 6 HOURS SCHEDULED
Status: DISCONTINUED | OUTPATIENT
Start: 2017-06-09 | End: 2017-06-09

## 2017-06-09 RX ORDER — HYDRALAZINE HYDROCHLORIDE 20 MG/ML
10 INJECTION INTRAMUSCULAR; INTRAVENOUS EVERY 6 HOURS PRN
Status: DISCONTINUED | OUTPATIENT
Start: 2017-06-09 | End: 2017-06-09 | Stop reason: HOSPADM

## 2017-06-09 RX ORDER — OXYCODONE HCL 5 MG/5 ML
5 SOLUTION, ORAL ORAL 4 TIMES DAILY PRN
Qty: 100 ML | Refills: 0 | Status: SHIPPED | OUTPATIENT
Start: 2017-06-09

## 2017-06-09 RX ORDER — CLINDAMYCIN HCL 300 MG
600 CAPSULE ORAL EVERY 8 HOURS SCHEDULED
Status: DISCONTINUED | OUTPATIENT
Start: 2017-06-09 | End: 2017-06-09 | Stop reason: HOSPADM

## 2017-06-09 RX ORDER — CLINDAMYCIN HCL 300 MG
600 CAPSULE ORAL EVERY 6 HOURS
Qty: 80 CAPSULE | Refills: 0 | Status: SHIPPED | OUTPATIENT
Start: 2017-06-09 | End: 2017-06-09

## 2017-06-09 RX ORDER — POLYETHYLENE GLYCOL 3350 17 G/17G
17 POWDER, FOR SOLUTION ORAL DAILY
Qty: 7 PACKET | Refills: 0 | Status: SHIPPED | OUTPATIENT
Start: 2017-06-09

## 2017-06-09 RX ORDER — CLINDAMYCIN HCL 300 MG
600 CAPSULE ORAL EVERY 8 HOURS
Qty: 60 CAPSULE | Refills: 0 | Status: SHIPPED | OUTPATIENT
Start: 2017-06-09 | End: 2017-06-19

## 2017-06-09 RX ORDER — CLINDAMYCIN HCL 300 MG
600 CAPSULE ORAL EVERY 6 HOURS SCHEDULED
Status: DISCONTINUED | OUTPATIENT
Start: 2017-06-09 | End: 2017-06-09

## 2017-06-09 RX ORDER — LABETALOL HYDROCHLORIDE 5 MG/ML
10 INJECTION, SOLUTION INTRAVENOUS EVERY 6 HOURS PRN
Status: DISCONTINUED | OUTPATIENT
Start: 2017-06-09 | End: 2017-06-09 | Stop reason: HOSPADM

## 2017-06-09 RX ADMIN — AMPICILLIN SODIUM AND SULBACTAM SODIUM 3 G: 2; 1 INJECTION, POWDER, FOR SOLUTION INTRAMUSCULAR; INTRAVENOUS at 08:51

## 2017-06-09 RX ADMIN — GABAPENTIN 600 MG: 600 TABLET, FILM COATED ORAL at 14:51

## 2017-06-09 RX ADMIN — ENOXAPARIN SODIUM 40 MG: 40 INJECTION SUBCUTANEOUS at 08:39

## 2017-06-09 RX ADMIN — CLINDAMYCIN HYDROCHLORIDE 600 MG: 300 CAPSULE ORAL at 14:51

## 2017-06-09 RX ADMIN — ASPIRIN 81 MG CHEWABLE TABLET 81 MG: 81 TABLET CHEWABLE at 08:40

## 2017-06-09 RX ADMIN — CLOPIDOGREL 75 MG: 75 TABLET, FILM COATED ORAL at 08:41

## 2017-06-09 RX ADMIN — AMPICILLIN SODIUM AND SULBACTAM SODIUM 3 G: 2; 1 INJECTION, POWDER, FOR SOLUTION INTRAMUSCULAR; INTRAVENOUS at 01:08

## 2017-06-09 RX ADMIN — FLUCONAZOLE 200 MG: 2 INJECTION INTRAVENOUS at 06:06

## 2017-06-09 RX ADMIN — BUSPIRONE HYDROCHLORIDE 7.5 MG: 7.5 TABLET ORAL at 08:39

## 2017-06-09 RX ADMIN — PEDIATRIC MULTIPLE VITAMIN W/ MINERALS & C CHEW TAB 60 MG 1 TABLET: CHEW TAB at 08:40

## 2017-06-09 RX ADMIN — ISOSORBIDE MONONITRATE 30 MG: 30 TABLET, EXTENDED RELEASE ORAL at 08:41

## 2017-06-09 RX ADMIN — GABAPENTIN 600 MG: 600 TABLET, FILM COATED ORAL at 08:40

## 2017-06-09 RX ADMIN — TOPIRAMATE 25 MG: 25 TABLET ORAL at 08:51

## 2017-06-09 RX ADMIN — PANTOPRAZOLE SODIUM 40 MG: 40 TABLET, DELAYED RELEASE ORAL at 08:41

## 2017-06-09 NOTE — PLAN OF CARE
Problem: Goal Outcome Summary  Goal: Goal Outcome Summary  Outcome: Improving     Pt discharged to [facility] with .   VSS, afebrile, denies pain.   Will return for follow up visit Wednesday.   Discharge instructions reviewed with patient, questions answered. Pt and  prepared for discharge and self care.

## 2017-06-09 NOTE — PROGRESS NOTES
Progress Note:    Hospital Day: 2  Date of Initial SW Assessment: 06/07/2017    Data: Otilia Mckee is a 52 yo female admitted to [facility] 06/06/2017.    Intervention: Writer, Abbie RNCC, and Donavon FAN met w/pt and pt's spouse Carlos.     Assessment: Per Thoracic Surgery, pt okay to dc today if pt remains local and follows up with Dr. Sepulveda on 6/14. Pt and spouse are from [place] and would not have local lodging.     Pt and spouse have been set up to stay at the [facility] in [place] on [address] from 6/9/17-6/15/17, with room cost expenses covered by [facility].     Pt and spouse asked about money for food when discharged. Writer explained that [facility] will be covering hotel expense, but not money for food. [facility] does have an included continental breakfast.     Pt's spouse requested money for parking. Writer explained that a one time voucher will be provided for pt to get his car out of the ramp, but no additional money for parking will be provided. The [employer] shuttle does pick pts up from [facility] every 30 minutes and is a free option.    Pt has [facility] which does not cover lodging or food expenses, and will not cover a TCU stay or home care services in [place]. Pt's spouse will be learning pt's dressing changes and RNCC will provide dressing supplies.    Plan:  -Discharge plans in progress: DC Fri 6/9 to [facility] ([address], [place], [place]. [phone]). Follow up CT and appt w/Dr. Sepulveda on Wed 6/14.   -Barriers to discharge plan: none  -Follow up plan: SW will continue to follow and assist as needed.    JOSE RAUL Blackwell, Down East Community HospitalSW  [facility]  6B Intermediate Care Unit   Phone: [phone]  Pager: [phone]

## 2017-06-09 NOTE — DISCHARGE INSTRUCTIONS
Diabetes Plan for Discharge:    lantus 22 units in the morning   Novolog 3 units with each meal ( if not eating, do not give)  Plus use correction scale ( if not eating, just use correction scale)    Do Not give Correction Insulin if Pre-Meal BG less than 140 before meals, before meals   1 per 35 >/=140   -174 = 1 unit.   -209 = 2 units.   -244 = 3 units.   -279 = 4 units.   -314 = 5 units.   -349 = 6 units.   -384 = 7 units.   -419 = 8 units.   BG >/=420 = 9 units.     Correction Scale - custom DOSING   Bedtime   Do Not give Bedtime Correction Insulin if BG less than 200   -234 = 1 unit   -269 = 2 units.   -304 = 3 units.   -339 = 4 units.   -374 = 5 units.   BG >/=375 = 6 units.       Test  glucose before meals, HS and 0200( if awake)    Start Metformin 500 mg with dinner ( may increase dose to twice daily in one week, if tolerating medication)   -goal blood sugars are , ok if occasional glucose of 250 or less  Call if blood sugars are 70 or less or > 250 consistantly    Call the diabetes management team with questions regarding your treatment plan after discharge.    Britney Delatorre PA-C [phone] (Friday - Monday, 8-5)  Kay Kaufman -[phone] ( Monday-Friday, 8-5)   or the on-call endocrinologist can be paged by the hospital  [phone].

## 2017-06-09 NOTE — PROGRESS NOTES
Calorie Counts    Intake Recorded For: 6/8 Kcals: 400 Protein: 13    # Meals Recorded: 2 meal   (First: 100% ice cream, 25% chicken broth, 10% greek yogurt)    (Second: 100% chicken noodle soup, diet ginger ale, 50% apple juice)     # Supplements Recorded: 0

## 2017-06-09 NOTE — PLAN OF CARE
"Problem: Goal Outcome Summary  Goal: Goal Outcome Summary  Outcome: Improving  /83  Temp 98.2  F (36.8  C) (Axillary)  Resp 16  Ht 1.651 m (5' 5\")  Wt 97.8 kg (215 lb 11.2 oz)  SpO2 100%  BMI 35.89 kg/m2  On 2L oxiplus mask overnight for TANIA, see RT note about CPAP mask. Sinus keely-NSR in 50s-60s. Dressing to neck CDI, to be changed by surgeon. 1x dose PRN morphine administered for pain. IV abx infusing via PIV intermittently. Tolerating full liq diet. Up with SBA, voiding without difficulty. Continue current POC.      "

## 2017-06-09 NOTE — PROGRESS NOTES
CLINICAL NUTRITION SERVICES - BRIEF NOTE     Nutrition Prescription    RECOMMENDATIONS FOR MDs/PROVIDERS TO ORDER:  - Consider Glucerna Shakes (with ice cream) to help with PO intakes    Malnutrition Status:    Unable to determine    Recommendations already ordered by Registered Dietitian (RD):  - Will order strawberry Glucerna WITH meals    Future Recs:   -- Pt requires higher energy needs for wound healing. If pt is unable to meet goal for estimated nutrition needs orally, would recommend switching from Glucerna (220 kcals, 10 g pro, 26 g CHO) to Ensure Plus (shakes) or Glucerna Shakes (made with ice cream, 350kcals, 12g pro, 41 g CHO)      EVALUATION OF THE PROGRESS TOWARD GOALS   -- Pt passed swallow study, will continue to monitor PO intakes. Please see RD note from 6/7 for full assessment.     Liliana Ruiz RD, MS, LD  6B- Pager: [phone]

## 2017-06-09 NOTE — PROGRESS NOTES
Physical Therapy Discharge Summary    Reason for therapy discharge:    Discharged to local hotel with     Progress towards therapy goal(s). See goals on Care Plan in [facility] electronic health record for goal details.  Goals not met.  Barriers to achieving goals:   discharge from facility.    Therapy recommendation(s):    Continued therapy is recommended.  Rationale/Recommendations:  OP cardiac rehab secodnary to recent NSTEMI.

## 2017-06-09 NOTE — PROGRESS NOTES
"Thoracic Surgery Progress Note    No acute events overnight. Tolerating full liquids without issue.     /83  Temp 98.2  F (36.8  C) (Axillary)  Resp 16  Ht 1.651 m (5' 5\")  Wt 97.8 kg (215 lb 11.2 oz)  SpO2 100%  BMI 35.89 kg/m2  Laying comfortably in bed in no acute distress  Awake, alert and appropriate  Non-labored breathing  Regular rate and rhythm  Abdomen soft, nontender, not distended.   Incision is clean, dry and intact, packing removed with serosanguinous drainage. Penrose in place.     //500    Leukocytosis resolved    A/P: 51F w/ multiple medical co-morbidities s/p neck exploration and abscess drainage    - Pain control  - Will order home CPAP for TANIA  - Home aspirin and plavix  - Continue home medications, appreciate pharmacy assistance  - Endo consult for DM management, appreciate assistance  - Continue abx for now, will determine plan on transition to PO and duration  - Full liquids, advance diet per speech  - Lovenox, Protonix  - Possible discharge soon once able to tolerate home dressing changes and transition to PO antibiotics. Will get CT today to evaluate further    Seen with thoracic team.     Winnie Arriola MD  General Surgery Resident  Pager: [phone]    "

## 2017-06-09 NOTE — PROGRESS NOTES
SPIRITUAL HEALTH SERVICES  SPIRITUAL ASSESSMENT Progress Note  [facility] 6B    Otilia, patient, will discharge today as she feels better and was eating large lunch. Told  about [facility] and [facility] to receive beakfast and foods for the next few days. They will follow up.  was able to give them a motel voucher and parking voucher. They hope to return home to [place] on Wednesday or Thursday from motel. No further plans to follow up.       Sanjeev Fields  Chaplain Resident  Pager [phone]

## 2017-06-09 NOTE — PLAN OF CARE
Problem: Goal Outcome Summary  Goal: Goal Outcome Summary  Pt seen for swallow tx- pt much improved with swallow function today- pt had already been drinking thin liquids today that had been brought from home without difficulty. With trials of thin and DD3 as well as regular solids- no overt s/s of aspiration or sensation of pharyngeal retention. Pt did 1x out of 4 trials have a mild throat clear with thin liquids by straw- suspect larger bolus size contributed to it but when limited to small sips by cup rim- then no overt s/s of aspiration. Recommending that pt advance diet to regular with thin liquid. NO straw;  Pt needs to be upright for all po, take small single sips/bites, alternate solids and liquids, pace self- eat slowly. Pt may be d/cing today- pt would like to go home 2/2 c/o no coverage for additional stay/services. If pt d/c's to home- would recommend if able that sptx HH f/u for diet tolerance. If pt still here tomorrow then will plan to f/u for diet tolerance/further education    Speech Language Therapy Discharge Summary    Reason for therapy discharge:    Discharged to home with home therapy.    Progress towards therapy goal(s). See goals on Care Plan in [facility] electronic health record for goal details.  Goals partially met.  Barriers to achieving goals:   discharge from facility.    Therapy recommendation(s):    Continued therapy is recommended.  Rationale/Recommendations:  See above summary.

## 2017-06-09 NOTE — PLAN OF CARE
Problem: Goal Outcome Summary  Goal: Goal Outcome Summary  D:  Temp: 98.4  F (36.9  C) Temp src: Oral BP: 132/73 Heart Rate: 70 Resp: 16 SpO2: 95 % O2 Device: None (Room air):  Patient is vitally stable on room air. She was advanced to full liquid diet today and is tolerating this well.  Attempted to apply cpap mask for TANIA, but RT was unable to place straps around neck without distrubing R IJ line or anterior neck dressing/surgical site.  Per RT recommendation will try 2L NC while sleeping until tomorrow when can reassess.  Will request MD place order for IJ line removal, and then may be able to place mask safely. On one occasion today, patient did have episode of sleep apneic obstruction and she desatted to mid 70% and HR dropped to 30-40 with a pause. Resolved when she was placed on her side.   is at bedside. He states that he is out of a job and he has no money to buy any food from cafeteria, nor any money to get his car out of the visitor parking ramp.  He requested to speak with social work to see if there is any kind of assistance available for their crisis. Voicemail message left with Rebecca Rockwell, ICU  with most recent note written in chart.   P:  Will continue with plan of care and close monitoring.

## 2017-06-09 NOTE — PROGRESS NOTES
R TL IJ removed per MD order. Pt tolerated well, pulse ox remained 95% throughout. Pressure applied ~ 5 min and pressure dressing placed. Pt instructed to lie flat until 0700 and verbalized understanding.

## 2017-06-09 NOTE — DISCHARGE SUMMARY
[facility], [place]    Thoracic Surgery Discharge Summary    Date of Admission:  6/6/2017  Date of Discharge:   6/9/2017  Admitting Provider: Dr. Sepulveda  Discharging Provider: Winnie Arriola  Date of Service (when I saw the patient): 06/09/17    Discharge Diagnosis:  Mediastinal phlegmon    Procedure/Surgery Performed this Hospitalization:    Procedure: Procedure(s):  Neck Exploration, Bronchoscopy - Wound Class: I-Clean   - Wound Class: II-Clean Contaminated   - Wound Class: I-Clean   Surgeon(s): Surgeon(s) and Role:     * Jacky Sepulveda MD - Primary     * Seamus Mccollum MD - Assisting   Specimens:   ID Type Source Tests Collected by Time Destination   1 : MEDIASTINAL ABSCESS Abscess Chest ABSCESS CULTURE AEROBIC BACTERIAL, FUNGUS CULTURE Jacky Sepulveda MD 6/6/2017 11:16 PM       Non-operative procedures None performed     History of Present Illness:  Otilia Mckee is a 51 year old female who presented as a transfer from an outside hospital with concerns for a mediastinal abscess. Patient was recently admitted to multiple hospitals, initially with pseudoseizures. Her hospital courses were complicated by intubation and an in stent coronary thrombosis for which she underwent coronary angiogram. She was ultimately readmitted with upper airway symptoms of stridor and on bronchoscopy was noted to have mucus plugging of the trachea and required emergent intubation. Post-procedurally she reportedly was in septic shock requiring pressors, though this resolved. On further workup, a CT scan was done concerning for a mediastinal abscess and she was transferred (extubated) to [facility] SICU for further workup and management.     Hospital Course:   Otilia Mckee was admitted on 6/6/2017 to the intensive care unit. She was taken to the operating room that evening for an flexible bronchoscopy and neck dissection with drainage of a pre-tracheal phlegmon. She tolerated the procedure  "well and was extubated without complication. Postoperatively she was transferred back to the intensive care unit for routine monitoring.     Her postoperative course was relatively unremarkable. On post-operative day one she was transferred to the step down unit. She remained stable from a cardio-respiratory standpoint.     She underwent daily dressing changes in a wet to dry fashion for her pre-tracheal phlegmon. She tolerated these well with oral pain medications and the wound was clean on serial dressing changes. An interval CT scan was performed prior to discharge which did not identify any un-drained abscess pockets or fluid collections.     She was continued on broad spectrum antibiotics which were transitioned to oral clindamycin at the time of discharge. Post operatively she had a leukocytosis to 22 which normalized to 9 prior to discharge.     She underwent therapy with speech language pathology and PT. Her diet was safely advanced to a regular diet once cleared by speech language pathology.     On arrival, her diabetes was poorly controlled with a hemoglobin A1C of 10.5 and sugars in the 200s. Endocrinology was consulted for assistance with management and she was discharged on both long and short acting insulin as well as metformin.     For her cardiac history and events at the outside hospital she was continued on her anti-platelet medications.     At the time of discharge, she was tolerating a regular diet with normal bowel function and her pain was well controlled with oral medications. Her  was instructed on dressing changes and showed understanding. She will be discharged locally with close follow up with Dr. Sepulveda in clinic.     Physical exam at time of discharge  /90 (BP Location: Left arm)  Temp 98.1  F (36.7  C) (Oral)  Resp 16  Ht 1.651 m (5' 5\")  Wt 97.8 kg (215 lb 11.2 oz)  SpO2 95%  BMI 35.89 kg/m2  Laying comfortably in bed in no acute distress  Awake, alert and " appropriate  Non-labored breathing on room air  Regular rate and rhythm  Abdomen is soft, non-tender and not distended  Extremities warm, well perfused  Incision is clean and dry, the packing is serosanguinous on removal. Wound bed clean, no purulence or malodor. Penrose drain in place and secured.     Discharge Medications:      Review of your medicines      START taking       Dose / Directions    acetaminophen 32 mg/mL solution   Commonly known as:  TYLENOL   Used for:  Soft tissue infection        Dose:  650 mg   Take 20.3 mLs (650 mg) by mouth every 4 hours as needed for mild pain or fever   Quantity:  473 mL   Refills:  0       clindamycin 300 MG capsule   Commonly known as:  CLEOCIN   Indication:  Skin and Soft Tissue Infection   Used for:  Soft tissue infection        Dose:  600 mg   Take 2 capsules (600 mg) by mouth every 8 hours for 10 days   Quantity:  60 capsule   Refills:  0       * insulin aspart 100 UNIT/ML injection   Commonly known as:  NovoLOG PEN   Used for:  Type 2 diabetes mellitus without complication, with long-term current use of insulin (H)   Replaces:  INSULIN PUMP - OUTPATIENT        Dose:  1-9 Units   Inject 1-9 Units Subcutaneous 3 times daily (before meals) Correction Scale Do Not give Correction Insulin if Pre-Meal BG less than 140  1 per 35 >/=140 -174 = 1 unit. -209 = 2 units. -244 = 3 units. -279 = 4 units. -314 = 5 units. -349 = 6 units. -384 = 7 units. -419 = 8 units. BG >/=420 = 9 units.   Quantity:  500 mL   Refills:  0       * insulin aspart 100 UNIT/ML injection   Commonly known as:  NovoLOG PEN   Used for:  Type 2 diabetes mellitus without complication, with long-term current use of insulin (H)        Dose:  1-6 Units   Inject 1-6 Units Subcutaneous At Bedtime   Quantity:  200 mL   Refills:  0       * insulin aspart 100 UNIT/ML injection   Commonly known as:  NovoLOG PEN   Used for:  Type 2 diabetes mellitus without complication,  with long-term current use of insulin (H)        Dose:  1-9 Units   Inject 1-9 Units Subcutaneous 3 times daily (with meals) DOSE:  1 units per 10 grams of carbohydrate.  Only chart total amount of units given.  Do not give if pre-prandial glucose is less than 60 mg/dL.   Quantity:  200 mL   Refills:  0       oxyCODONE 5 MG/5ML solution   Commonly known as:  ROXICODONE   Used for:  Soft tissue infection        Dose:  5 mg   Take 5 mLs (5 mg) by mouth 4 times daily as needed for moderate to severe pain   Quantity:  100 mL   Refills:  0       polyethylene glycol Packet   Commonly known as:  MIRALAX/GLYCOLAX   Used for:  Soft tissue infection        Dose:  17 g   Take 17 g by mouth daily   Quantity:  7 packet   Refills:  0       * Notice:  This list has 3 medication(s) that are the same as other medications prescribed for you. Read the directions carefully, and ask your doctor or other care provider to review them with you.      CONTINUE these medicines which may have CHANGED, or have new prescriptions. If we are uncertain of the size of tablets/capsules you have at home, strength may be listed as something that might have changed.       Dose / Directions    insulin glargine 100 UNIT/ML injection   Commonly known as:  LANTUS   This may have changed:    - how much to take  - when to take this   Used for:  Type 2 diabetes mellitus without complication, with long-term current use of insulin (H)        Dose:  22 Units   Inject 22 Units Subcutaneous every morning   Quantity:  6.6 mL   Refills:  3       metFORMIN 500 MG tablet   Commonly known as:  GLUCOPHAGE   This may have changed:    - how much to take  - when to take this   Used for:  Type 2 diabetes mellitus without complication, with long-term current use of insulin (H)        Dose:  500 mg   Take 1 tablet (500 mg) by mouth daily (with dinner)   Quantity:  60 tablet   Refills:  0         CONTINUE these medicines which have NOT CHANGED       Dose / Directions    ASPIRIN  PO        Dose:  81 mg   Take 81 mg by mouth daily   Refills:  0       BUSPIRONE HCL PO        Dose:  7.5 mg   Take 7.5 mg by mouth 2 times daily   Refills:  0       CLOPIDOGREL BISULFATE PO        Dose:  75 mg   Take 75 mg by mouth daily   Refills:  0       DULOXETINE HCL PO        Dose:  60 mg   Take 60 mg by mouth At Bedtime   Refills:  0       GABAPENTIN PO        Dose:  600 mg   Take 600 mg by mouth 3 times daily   Refills:  0       isosorbide mononitrate 30 MG 24 hr tablet   Commonly known as:  IMDUR        Dose:  30 mg   Take 30 mg by mouth daily   Refills:  0       loratadine 10 MG tablet   Commonly known as:  CLARITIN        Dose:  10 mg   Take 10 mg by mouth daily   Refills:  0       NITROSTAT SL        Dose:  0.4 mg   Place 0.4 mg under the tongue every 5 minutes as needed for chest pain   Refills:  0       PANTOPRAZOLE SODIUM PO        Dose:  40 mg   Take 40 mg by mouth daily   Refills:  0       ROSUVASTATIN CALCIUM PO        Dose:  20 mg   Take 20 mg by mouth daily   Refills:  0       TOPIRAMATE PO   Indication:  Migraine Headache        Dose:  25 mg   Take 25 mg by mouth daily   Refills:  0         STOP taking          INSULIN PUMP - OUTPATIENT   Replaced by:  insulin aspart 100 UNIT/ML injection                Where to get your medicines      These medications were sent to [facility] - [place], [place] - [address] [address]     Phone:  [phone]      acetaminophen 32 mg/mL solution     clindamycin 300 MG capsule     insulin aspart 100 UNIT/ML injection     insulin glargine 100 UNIT/ML injection     metFORMIN 500 MG tablet     polyethylene glycol Packet         Some of these will need a paper prescription and others can be bought over the counter. Ask your nurse if you have questions.     Bring a paper prescription for each of these medications      insulin aspart 100 UNIT/ML injection     insulin aspart 100 UNIT/ML injection     oxyCODONE  5 MG/5ML solution             Discharge Instructions:     CT Soft tissue neck w/o contrast   Administration of IV contrast (contrast agent, dose, and amount) will be tailored to this examination per the appropriate written protocol listed in the Protocol E-Book, or by the supervising imaging provider.      CT Chest w/o contrast   Administration of IV contrast (contrast agent, dose, and amount) will be tailored to this examination per the appropriate written protocol listed in the Protocol E-Book, or by the supervising imaging provider.      Reason for your hospital stay   Mediastinal abscess     Adult [facility] Follow-up and recommended labs and tests   Follow up on Wednesday 6/14 in clinic with Dr. Sepulveda. You should undergo a CT scan prior to the appointment.     Appointments on [facility] and[facility]or [facility] (with [facility] or [facility] provider or service). Call [phone] if you haven't heard regarding these appointments within 7 days of discharge.     Supplies   List the supplies the pt needs to go home:  -Small kerlix rolls  -4x4 gauze  -Sterile saline flushes/bottle  -Medipore tape  -Q-tips for packing  -Scissors     Wound care and dressings   Instructions to care for your wound at home:   -Perform dressing changes daily  -If desired, can take tylenol or oxycodone prior to dressing change. Can shower and get dressing wet, to make it easier to remove.   1) Remove the tape  2) Remove old dressing  3) Using clean gloves, moisten kerlix with sterile irrigation  4) Wring the kerlix out so it is moist, but not dripping wet  5) Gently pack the wound using a q-tip to pack the kerlix gauze gently to the base of the wound. Continue to pack the entire wound.   6) Cover wound packing with 4x4 gauze, make sure the penrose drain is also covered  7) Tape the dressing down.   Call with any questions or concerns.     Discharge Instructions   THORACIC SURGERY DISCHARGE INSTRUCTIONS    ACTIVITY  If your plans upon discharge include  "prolonged periods of sitting (i.e a lengthy car or plane ride), it is highly beneficial to get up and walk at least once per hour to help prevent swelling and blood clots.     Activity as tolerated, no strenous activity.      DRESSINGS/INCISIONS  Daily dressing changes to be performed by your  as instructed in the hospital. You should moisten gauze with normal saline and gently back into the wound. Cover this and the penrose drain with 4x4s and tape.     OTHER INSTRUCTIONS  Take incentive spirometer home for continued frequent use    Stay hydrated. Take over the counter fiber (metamucil or benefiber) and stool softeners (Miralax, docusate or senna) if becoming constipated. You have also been prescribed a stool softener that you should take while taking narcotics.    No driving while taking narcotic pain medication.    Transition to ibuprofen or tylenol/acetaminophen for pain control. Do not take tylenol/acetaminophen and acetaminophen containing narcotic (e.g., percocet or vicodin) at the same time. If you have known ulcer problems, or kidney trouble (elevated creatinine) do not take the ibuprofen.      WHEN TO CALL    Call the numbers below for fever greater than 101.5, chills, increased size of incision, red skin around incision, vision changes, muscle strength changes, sensation changes, shortness of breath, or other concerns.    In emergencies, call 911    For other Questions or Concerns;  A.) During weekday working hours (Monday through Friday 8am to 4:30pm)  call [phone] and ask to speak to a clinical nurse specialist.    B.) At nights (after 4:30pm), on weekends, or if urgent call [phone] and  tell the  \"I would like to page job code [phone], the thoracic surgery  fellow on call, please.\"    FOLLOW UP  Follow up in clinic with Dr. Sepulveda on 6/14, with a CT scan. Please call if you have not been reached to schedule this appointment.  You should follow up with your primary care " physician and endocrinologist when you return home to discuss your recent hospitalization and medication changes.     Full Code       Condition at time of discharge: Stable    Discharged to: Home    Patient was discussed with Dr. Sepulveda on the date of discharge.       Discharge summary compiled by    Winnie Arriola MD  General Surgery  Pager: [phone]

## 2017-06-09 NOTE — PROGRESS NOTES
Diabetes Consult Daily  Progress Note          Assessment/Plan:   Otilia cMkee is a 51 year old female with history of type 2 diabetes, hypertension, hyperlipidemia, depression CAD ( s/p stent 2/2016 and 2017, on Plavix) who presented from [facility] as a transfer on (6/6/2017) for mediastinitis s/p neck exploration and abscess drainage     Type 2 diabetic:  poorly controlled with HGB A1C 10.5%    Plan for discharge:  - lantus 22 units in the morning  - Novolog 3 units with each meal ( based on  1 unit per 10 grams of CHO)   -Aspart 1 unit per 35 > 140 before meals and > 200 HS  - monitor glucose before meals, HS and 0200( if awake)  -please send with patient, insulin pens from hospital stay ( will need to get relabeled in pharmacy for home use)  -resume Metformin 500 mg with dinner ( may increase dose to twice daily in one week, if tolerating medication) does not need this medication  Also order:  Alcohol supplies  Insulin pen needles 4mm 32 mauro               Interval History:     I reviewed the last 24 hours progress notes  Blood sugars are improving  glucose at 0200, not documented  Fasting glucose 141  Discussed diabetes plan for discharge, Otilia does not want to return to her pump. Prefers her current regime.  appetite is improving and diet has been advanced  Will be staying locally with follow-up appointment in one week. If given the ok after follow-up appointment, will be returning to [place]. Advised to follow-up with Endocrinology when she returns.      Recent Labs  Lab 06/09/17  1205 06/09/17  0821 06/08/17  2206 06/08/17  1604 06/08/17  1145 06/08/17  0810 06/08/17  0549  06/07/17  0321  06/06/17  2305 06/06/17  2104   GLC  --   --   --   --   --   --  172*  --  246*  --  192* 184*   * 141* 178* 172* 211* 180*  --   < >  --   < >  --   --    < > = values in this interval not displayed.            Review of Systems:      please see interval history       Medications:  "      Active Diet Order      Regular Diet Adult     Physical Exam:  Gen: Alert, resting in bed, in NAD   HEENT: NC/AT, mucous membranes are moist, dressing to left side of neck  Resp: Unlabored  Ext: moving all extremities   Neuro:oriented x3, communicating clearly  /65 (BP Location: Left arm)  Temp 97.9  F (36.6  C) (Oral)  Resp 18  Ht 1.651 m (5' 5\")  Wt 97.8 kg (215 lb 11.2 oz)  SpO2 95%  BMI 35.89 kg/m2           Data:     Lab Results   Component Value Date    A1C 10.5 06/08/2017              CBC RESULTS:   Recent Labs   Lab Test  06/09/17   0543   WBC  9.9   RBC  3.35*   HGB  9.1*   HCT  29.1*   MCV  87   MCH  27.2   MCHC  31.3*   RDW  15.0   PLT  267     Recent Labs   Lab Test  06/08/17   0549  06/07/17   0321   NA  143  143   POTASSIUM  3.4  4.4   CHLORIDE  109  110*   CO2  28  24   ANIONGAP  6  9   GLC  172*  246*   BUN  24  23   CR  0.71  0.74   TYLER  8.9  8.1*     Liver Function Studies - No results for input(s): PROTTOTAL, ALBUMIN, BILITOTAL, ALKPHOS, AST, ALT, BILIDIRECT in the last 20804 hours.  Lab Results   Component Value Date    INR 1.29 06/06/2017           Cathy Kaufman, HERBIE pager [phone]  Diabetes Management Job Code [id number]          "

## 2017-06-11 ENCOUNTER — NURSE TRIAGE (OUTPATIENT)
Dept: NURSING | Facility: CLINIC | Age: 52
End: 2017-06-11

## 2017-06-11 ENCOUNTER — HOSPITAL ENCOUNTER (EMERGENCY)
Facility: CLINIC | Age: 52
Discharge: HOME | End: 2017-06-11
Attending: EMERGENCY MEDICINE | Admitting: EMERGENCY MEDICINE
Payer: COMMERCIAL

## 2017-06-11 VITALS
SYSTOLIC BLOOD PRESSURE: 124 MMHG | HEART RATE: 56 BPM | WEIGHT: 216.5 LBS | HEIGHT: 65 IN | TEMPERATURE: 98.1 F | RESPIRATION RATE: 17 BRPM | OXYGEN SATURATION: 99 % | DIASTOLIC BLOOD PRESSURE: 70 MMHG | BODY MASS INDEX: 36.07 KG/M2

## 2017-06-11 DIAGNOSIS — Z51.89 VISIT FOR WOUND CHECK: ICD-10-CM

## 2017-06-11 LAB
BACTERIA SPEC CULT: ABNORMAL
MICRO REPORT STATUS: ABNORMAL
MICROORGANISM SPEC CULT: ABNORMAL
SPECIMEN SOURCE: ABNORMAL

## 2017-06-11 PROCEDURE — 99283 EMERGENCY DEPT VISIT LOW MDM: CPT | Performed by: EMERGENCY MEDICINE

## 2017-06-11 PROCEDURE — 99283 EMERGENCY DEPT VISIT LOW MDM: CPT | Mod: Z6 | Performed by: EMERGENCY MEDICINE

## 2017-06-11 ASSESSMENT — ENCOUNTER SYMPTOMS
ABDOMINAL PAIN: 0
FEVER: 0
SHORTNESS OF BREATH: 0

## 2017-06-11 NOTE — DISCHARGE INSTRUCTIONS
Please keep all your following appointments and follow discharge instructions as previously given.    Return to the emergency department for any worsening redness, swelling, drainage, pain, fever or any other concerns as given or discussed.     When performing dressing changes, be sure to press firmly along the drain to avoid pulling it out.

## 2017-06-11 NOTE — TELEPHONE ENCOUNTER
Reason for Disposition    Sounds like a serious complication to the triager    Additional Information    Negative: [1] Major abdominal surgical incision AND [2] wound gaping open AND [3] visible internal organs    Negative: Sounds like a life-threatening emergency to the triager    Negative: [1] Bleeding from incision AND [2] won't stop after 10 minutes of direct pressure    Negative: [1] Widespread rash AND [2] bright red, sunburn-like    Negative: Severe pain in the incision    Negative: [1] Suture came out early AND [2] wound gaping AND [3] < 48 hours since sutures placed    Negative: [1] Incision gaping open AND [2] length of opening > 2 inches (5 cm)    Negative: Patient sounds very sick or weak to the triager    Protocols used: POST-OP INCISION SYMPTOMS-ADULT-AH

## 2017-06-11 NOTE — ED NOTES
Pt reports drain to L side of neck out further than before, concern for displacement. Recent surgery

## 2017-06-11 NOTE — ED PROVIDER NOTES
History     Chief Complaint   Patient presents with     Post-op Problem     Pt reports drain to L side of neck out further than before, concern for displacement. Recent surgery     HPI  Otilia Mckee is a 51 year old female with a history of mediastinal abscess status post thoracic lung wedge resection 6/6/2017 (5 days ago) complicated by mucus blugging of the trachea requiring intubation,and in-stent coronary thrombosis status post coronary angiogram who presents to the Emergency Department with concern for left-sided post-surgical neck drain displacement. She has follow-up next week. No fever, chills, shortness of breath. No other complaints at this time.     I have reviewed the Medications, Allergies, Past Medical and Surgical History, and Social History in the [employer] system.  History reviewed. No pertinent past medical history.    Past Surgical History:   Procedure Laterality Date     ESOPHAGOSCOPY, GASTROSCOPY, DUODENOSCOPY (EGD), COMBINED N/A 6/6/2017    Procedure: COMBINED ESOPHAGOSCOPY, GASTROSCOPY, DUODENOSCOPY (EGD);;  Surgeon: Jacky Sepulveda MD;  Location: [facility] OR     THORACOSCOPIC WEDGE RESECTION LUNG N/A 6/6/2017    Procedure: THORACOSCOPIC WEDGE RESECTION LUNG;  Neck Exploration, Bronchoscopy;  Surgeon: Jacky Sepulveda MD;  Location: [facility] OR       No family history on file.    Social History   Substance Use Topics     Smoking status: Not on file     Smokeless tobacco: Not on file     Alcohol use Not on file       No current facility-administered medications for this encounter.      Current Outpatient Prescriptions   Medication     acetaminophen (TYLENOL) 32 mg/mL solution     polyethylene glycol (MIRALAX/GLYCOLAX) Packet     oxyCODONE (ROXICODONE) 5 MG/5ML solution     clindamycin (CLEOCIN) 300 MG capsule     insulin glargine (LANTUS) 100 UNIT/ML injection     insulin aspart (NOVOLOG PEN) 100 UNIT/ML injection     insulin aspart (NOVOLOG PEN) 100 UNIT/ML injection     insulin aspart  "(NOVOLOG PEN) 100 UNIT/ML injection     metFORMIN (GLUCOPHAGE) 500 MG tablet     ASPIRIN PO     isosorbide mononitrate (IMDUR) 30 MG 24 hr tablet     BUSPIRONE HCL PO     CLOPIDOGREL BISULFATE PO     DULOXETINE HCL PO     GABAPENTIN PO     loratadine (CLARITIN) 10 MG tablet     Nitroglycerin (NITROSTAT SL)     PANTOPRAZOLE SODIUM PO     ROSUVASTATIN CALCIUM PO     TOPIRAMATE PO          Allergies   Allergen Reactions     Contrast Dye Shortness Of Breath and Hives      Review of Systems   Constitutional: Negative for fever.   Respiratory: Negative for shortness of breath.    Cardiovascular: Negative for chest pain.   Gastrointestinal: Negative for abdominal pain.   Musculoskeletal:        Left post-surgical neck drain displacement   All other systems reviewed and are negative.      Physical Exam   BP: 107/64  Pulse: 56  Temp: 97.3  F (36.3  C)  Resp: 16  Height: 165.1 cm (5' 5\")  Weight: 98.2 kg (216 lb 8 oz)  SpO2: 95 %  Physical Exam  GEN: Well appearing, non toxic, cooperative and conversant.   HEENT: The head is normocephalic and atraumatic. Pupils are equal round and reactive to light. Extraocular motions are intact. There is no facial swelling. The neck is nontender and supple.   CV: Regular rate and rhythm without murmurs rubs or gallops. 2+ radial pulses bilaterally.  PULM: Clear to auscultation bilaterally.  ABD: Obese, Soft, nontender, nondistended. Normal bowel sounds.   EXT: Full range of motion.  No edema.  NEURO: Cranial nerves II through XII are intact and symmetric. Bilateral upper and lower extremities grossly show full range of motion without any focal deficits.   SKIN: 10 cm open wound along the left upper chest with some packed gauze minimally soaked with serous fluid. No purulence or bleeding, clean appearing. Penrose drain with suture in place, appears to be minimally displaced as there is a kink in the tube.   PSYCH: Calm and cooperative, interactive.        ED Course     ED Course "     Procedures               Labs Ordered and Resulted from Time of ED Arrival Up to the Time of Departure from the ED - No data to display         Assessments & Plan (with Medical Decision Making)   Wound check- penrose drain in satisfactory position  Confirmed by surgery consult  No other issues  Will keep all f/u apts and d/c plans as given at d/c    - Patient ready and eager for discharge. Care plan, follow up plan, and reasons to return immediately to the ED were dicussed with the patient and summarized as noted in the discharge instructions.      I have reviewed the nursing notes.    I have reviewed the findings, diagnosis, plan and need for follow up with the patient.    New Prescriptions    No medications on file       Final diagnoses:   Visit for wound check   IBing, am serving as a trained medical scribe to document services personally performed by Caio Elliott MD, based on the provider's statements to me. This document has been checked and approved by the attending provider.    Caio SERRANO MD was physically present and have reviewed and verified the accuracy of this note documented by Bing Iniguez.       6/11/2017   [facility], [place], EMERGENCY DEPARTMENT     Caio Elliott MD  06/11/17 3030

## 2017-06-11 NOTE — TELEPHONE ENCOUNTER
She was recently discharged and has a drainage tube in her neck. She thinks that it is sticking out too far this morning.    Jessenia Rockwell RN     [place] Nurse Advisor

## 2017-06-11 NOTE — CONSULTS
SURGERY HISTORY AND PHYSICAL  6/11/2017    Reason for Consult: Penrose drain issues   Consult Requested by: ED    HISTORY PRESENTING ILLNESS: This is a 51 year old female with a history of neck exploration and bronchoscopy for a mediastinal abscess by Dr. Sepulveda presents to the ED reporting that the penrose drain that was placed in her wound has moved out about an inch today. She is doing well otherwise. Her  helps her with dressing changes. No fevers/chills. No extra drainage from the wound. No erythema around incision site. She has been eating and drinking ok.     Review of systems:   A 10 point review of systems was performed and was negative except for the items in the HPI.     PAST MEDICAL HISTORY:  Seizures, CAD s/p stent (2016, 2017), DMII, NSTEMI, pseudoseizure    PAST SURGICAL HISTORY:  Past Surgical History:   Procedure Laterality Date     ESOPHAGOSCOPY, GASTROSCOPY, DUODENOSCOPY (EGD), COMBINED N/A 6/6/2017    Procedure: COMBINED ESOPHAGOSCOPY, GASTROSCOPY, DUODENOSCOPY (EGD);;  Surgeon: Jacky Sepulveda MD;  Location:  OR     THORACOSCOPIC WEDGE RESECTION LUNG N/A 6/6/2017    Procedure: THORACOSCOPIC WEDGE RESECTION LUNG;  Neck Exploration, Bronchoscopy;  Surgeon: Jacky Sepulevda MD;  Location:  OR     FAMILY HISTORY:  Non contributory    SOCIAL HISTORY:   involved in her care  Lives in [place]    ALLERGIES:  Allergies   Allergen Reactions     Contrast Dye Shortness Of Breath and Hives     MEDICATIONS:  Reviewed in the chart    PHYSICAL EXAMINATION:  Temp:  [97.3  F (36.3  C)] 97.3  F (36.3  C)  Pulse:  [56] 56  Resp:  [16] 16  BP: (107)/(64) 107/64  SpO2:  [95 %] 95 %    General appearance: in NAD  Neuro: No gross deficits noted, moving extremities spontaneously.  CV: Hemodynamically stable  Neck: Incision with packing in place, penrose in place but moved out about an inch. Penrose is still well into the wound, suture in place.   Pulm: Non-labored breathing  Abd: soft;  non-distended, non-tender  Extremities: no edema  Skin: warm and well-perfused.     LABS:  None    Imaging:  None    ASSESSMENT/PLAN:   Otilia Mckee is a  51 year old female with a history of neck exploration and bronchoscopy for a mediastinal abscess by Dr. Sepulveda presents to the ED reporting that the penrose drain that was placed in her wound has moved out about an inch. Penrose is still well into the wound, keeping it open. The suture is in place as well.      - Hold penrose when doing dressing changes so that it doesn't dislodge.   - Continue dressing changes as before.   - Ok to discharge home   - Follow up in clinic as scheduled on Wed 6/14.    Pt discussed with Thoracic fellow.     Gurwinder Hartmann, PGY2  [phone]

## 2017-06-11 NOTE — ED AVS SNAPSHOT
[facility], Emergency Department    [address] [address]    Phone:  [phone]                                       Otilia Mckee   MRN: [id number]    Department:  [facility], Emergency Department   Date of Visit:  6/11/2017           Patient Information     Date Of Birth          1965        Your diagnoses for this visit were:     Visit for wound check        You were seen by Caio Elliott MD.        Discharge Instructions       Please keep all your following appointments and follow discharge instructions as previously given.    Return to the emergency department for any worsening redness, swelling, drainage, pain, fever or any other concerns as given or discussed.     When performing dressing changes, be sure to press firmly along the drain to avoid pulling it out.         Future Appointments        Provider Department Dept Phone Center    6/14/2017 7:20 AM [facility] CT ROOM [address] [place] [address] [phone] [facility]    6/14/2017 7:40 AM [facility] CT ROOM [address] [place] [address] [phone] [facility]    6/14/2017 12:30 PM Jacky Sepulveda MD [facility] [phone] [facility]      24 Hour Appointment Hotline       To make an appointment at any [facility], call [phone] ([phone]). If you don't have a family doctor or clinic, we will help you find one. [facility] are conveniently located to serve the needs of you and your family.             Review of your medicines      Our records show that you are taking the medicines listed below. If these are incorrect, please call your family doctor or clinic.        Dose / Directions Last dose taken    acetaminophen 32 mg/mL solution   Commonly known as:  TYLENOL   Dose:  650 mg   Quantity:  473 mL        Take 20.3 mLs (650 mg) by mouth every 4 hours as needed for mild pain or fever   Refills:  0        ASPIRIN PO   Dose:  81 mg        Take 81 mg by mouth  daily   Refills:  0        BUSPIRONE HCL PO   Dose:  7.5 mg        Take 7.5 mg by mouth 2 times daily   Refills:  0        clindamycin 300 MG capsule   Commonly known as:  CLEOCIN   Dose:  600 mg   Indication:  Skin and Soft Tissue Infection   Quantity:  60 capsule        Take 2 capsules (600 mg) by mouth every 8 hours for 10 days   Refills:  0        CLOPIDOGREL BISULFATE PO   Dose:  75 mg        Take 75 mg by mouth daily   Refills:  0        DULOXETINE HCL PO   Dose:  60 mg        Take 60 mg by mouth At Bedtime   Refills:  0        GABAPENTIN PO   Dose:  600 mg        Take 600 mg by mouth 3 times daily   Refills:  0        * insulin aspart 100 UNIT/ML injection   Commonly known as:  NovoLOG PEN   Dose:  1-9 Units   Quantity:  500 mL        Inject 1-9 Units Subcutaneous 3 times daily (before meals) Correction Scale Do Not give Correction Insulin if Pre-Meal BG less than 140  1 per 35 >/=140 -174 = 1 unit. -209 = 2 units. -244 = 3 units. -279 = 4 units. -314 = 5 units. -349 = 6 units. -384 = 7 units. -419 = 8 units. BG >/=420 = 9 units.   Refills:  0        * insulin aspart 100 UNIT/ML injection   Commonly known as:  NovoLOG PEN   Dose:  1-6 Units   Quantity:  200 mL        Inject 1-6 Units Subcutaneous At Bedtime   Refills:  0        * insulin aspart 100 UNIT/ML injection   Commonly known as:  NovoLOG PEN   Dose:  1-9 Units   Quantity:  200 mL        Inject 1-9 Units Subcutaneous 3 times daily (with meals) DOSE:  1 units per 10 grams of carbohydrate.  Only chart total amount of units given.  Do not give if pre-prandial glucose is less than 60 mg/dL.   Refills:  0        insulin glargine 100 UNIT/ML injection   Commonly known as:  LANTUS   Dose:  22 Units   Quantity:  6.6 mL        Inject 22 Units Subcutaneous every morning   Refills:  3        isosorbide mononitrate 30 MG 24 hr tablet   Commonly known as:  IMDUR   Dose:  30 mg        Take 30 mg by mouth daily    Refills:  0        loratadine 10 MG tablet   Commonly known as:  CLARITIN   Dose:  10 mg        Take 10 mg by mouth daily   Refills:  0        metFORMIN 500 MG tablet   Commonly known as:  GLUCOPHAGE   Dose:  500 mg   Quantity:  60 tablet        Take 1 tablet (500 mg) by mouth daily (with dinner)   Refills:  0        NITROSTAT SL   Dose:  0.4 mg        Place 0.4 mg under the tongue every 5 minutes as needed for chest pain   Refills:  0        oxyCODONE 5 MG/5ML solution   Commonly known as:  ROXICODONE   Dose:  5 mg   Quantity:  100 mL        Take 5 mLs (5 mg) by mouth 4 times daily as needed for moderate to severe pain   Refills:  0        PANTOPRAZOLE SODIUM PO   Dose:  40 mg        Take 40 mg by mouth daily   Refills:  0        polyethylene glycol Packet   Commonly known as:  MIRALAX/GLYCOLAX   Dose:  17 g   Quantity:  7 packet        Take 17 g by mouth daily   Refills:  0        ROSUVASTATIN CALCIUM PO   Dose:  20 mg        Take 20 mg by mouth daily   Refills:  0        TOPIRAMATE PO   Dose:  25 mg   Indication:  Migraine Headache        Take 25 mg by mouth daily   Refills:  0        * Notice:  This list has 3 medication(s) that are the same as other medications prescribed for you. Read the directions carefully, and ask your doctor or other care provider to review them with you.            Orders Needing Specimen Collection     None      Pending Results     No orders found from 6/9/2017 to 6/12/2017.            Pending Culture Results     No orders found from 6/9/2017 to 6/12/2017.            Pending Results Instructions     If you had any lab results that were not finalized at the time of your Discharge, you can call the ED Lab Result RN at [phone]. You will be contacted by this team for any positive Lab results or changes in treatment. The nurses are available 7 days a week from 10A to 6:30P.  You can leave a message 24 hours per day and they will return your call.        Thank you for choosing [facility]  "      Thank you for choosing [facility] for your care. Our goal is always to provide you with excellent care. Hearing back from our patients is one way we can continue to improve our services. Please take a few minutes to complete the written survey that you may receive in the mail after you visit with us. Thank you!        [facility] Information     [employer] lets you send messages to your doctor, view your test results, renew your prescriptions, schedule appointments and more. To sign up, go to [email or web address] . Click on \"Log in\" on the left side of the screen, which will take you to the Welcome page. Then click on \"Sign up Now\" on the right side of the page.     You will be asked to enter the access code listed below, as well as some personal information. Please follow the directions to create your username and password.     Your access code is: [email or web address]  Expires: 2017 12:15 PM     Your access code will  in 90 days. If you need help or a new code, please call your [facility] clinic or [phone].        Care EveryWhere ID     This is your Care EveryWhere ID. This could be used by other organizations to access your [facility] medical records  [id number]        After Visit Summary       This is your record. Keep this with you and show to your community pharmacist(s) and doctor(s) at your next visit.                  "

## 2017-06-11 NOTE — ED AVS SNAPSHOT
[facility] [place], Emergency Department    [address] [place] [address]    Phone:  [phone]                                       Otilia Mckee   MRN: [id number]    Department:  [facility], Emergency Department   Date of Visit:  6/11/2017           After Visit Summary Signature Page     I have received my discharge instructions, and my questions have been answered. I have discussed any challenges I see with this plan with the nurse or doctor.    ..........................................................................................................................................  Patient/Patient Representative Signature      ..........................................................................................................................................  Patient Representative Print Name and Relationship to Patient    ..................................................               ................................................  Date                                            Time    ..........................................................................................................................................  Reviewed by Signature/Title    ...................................................              ..............................................  Date                                                            Time

## 2017-06-12 ENCOUNTER — CARE COORDINATION (OUTPATIENT)
Dept: CARE COORDINATION | Facility: CLINIC | Age: 52
End: 2017-06-12

## 2017-06-12 NOTE — PROGRESS NOTES
"[facility]  \"Hello, my name is Aida Maza , and I am calling from the [facility].  I want to check in and see how you are doing, after leaving the hospital.  You may also receive a call from your Care Coordinator (care team), but I want to make sure you don t have any urgent needs.  I have a couple questions to review with you:     Post-Discharge Outreach                                                    Otilia Mckee is a 51 year old female     Follow-up Appointments           Adult [facility] Follow-up and recommended labs and tests         Follow up on Wednesday 6/14 in clinic with Dr. Sepulveda. You should undergo a CT scan prior to the appointment.      Appointments on [place] and/or [facility] (with [facility] or [facility] provider or service). Call [phone] if you haven't heard regarding these appointments within 7 days of discharge.                        Your next 10 appointments already scheduled            Jun 14, 2017  7:20 AM CDT   (Arrive by 7:05 AM)   CT CHEST W/O CONTRAST with [facility] [place] [address] CT ([facility])     [address] [facility]  [address] [facility] [id number] [phone]                              Jun 14, 2017  7:40 AM CDT   (Arrive by 7:25 AM)   CT SOFT TISSUE NECK W/O CONTRAST with [address] [place] [address] CT ([facility])     [address] [facility]  [address] [facility] [id number] [phone]                              Jun 14, 2017 12:30 PM CDT   (Arrive by 12:15 PM)   New Patient Visit with Jacky Sepulveda MD   [facility] ([facility])     [address] [place] [address]  [address] [facility] [id number] [phone]             Care Team:    Patient Care Team       Relationship Specialty Notifications Start End    No Ref-Primary, Physician PCP - General   6/8/17     Comment:   PCP is Ruthie Kulkarni in [facility]. "            Transition of Care Review                                                      Invalid number, DC calls will be discontinued    Plan                                                      Thanks for your time.  Your Care Coordinator may follow-up within the next couple days.  In the meantime if you have questions, concerns or problems call your care team.        Aida Maza

## 2017-06-14 ENCOUNTER — ALLIED HEALTH/NURSE VISIT (OUTPATIENT)
Dept: ONCOLOGY | Facility: CLINIC | Age: 52
End: 2017-06-14

## 2017-06-14 ENCOUNTER — OFFICE VISIT (OUTPATIENT)
Dept: SURGERY | Facility: CLINIC | Age: 52
End: 2017-06-14
Attending: THORACIC SURGERY (CARDIOTHORACIC VASCULAR SURGERY)
Payer: COMMERCIAL

## 2017-06-14 VITALS
HEART RATE: 68 BPM | RESPIRATION RATE: 18 BRPM | TEMPERATURE: 98.2 F | OXYGEN SATURATION: 96 % | WEIGHT: 210.3 LBS | SYSTOLIC BLOOD PRESSURE: 131 MMHG | HEIGHT: 65 IN | BODY MASS INDEX: 35.04 KG/M2 | DIASTOLIC BLOOD PRESSURE: 78 MMHG

## 2017-06-14 DIAGNOSIS — Z71.9 VISIT FOR COUNSELING: Primary | ICD-10-CM

## 2017-06-14 DIAGNOSIS — J85.3 MEDIASTINAL ABSCESS (H): Primary | ICD-10-CM

## 2017-06-14 PROCEDURE — 99212 OFFICE O/P EST SF 10 MIN: CPT | Mod: ZF

## 2017-06-14 RX ORDER — OXYCODONE HCL 5 MG/5 ML
5 SOLUTION, ORAL ORAL EVERY 4 HOURS PRN
Qty: 150 ML | Refills: 0 | Status: SHIPPED | OUTPATIENT
Start: 2017-06-14

## 2017-06-14 RX ORDER — OXYCODONE HYDROCHLORIDE 5 MG/1
5 TABLET ORAL EVERY 4 HOURS PRN
Qty: 30 TABLET | Refills: 0 | Status: SHIPPED | OUTPATIENT
Start: 2017-06-14

## 2017-06-14 RX ORDER — OXYCODONE HYDROCHLORIDE 5 MG/1
5 CAPSULE ORAL EVERY 4 HOURS PRN
Qty: 30 CAPSULE | Refills: 0 | Status: SHIPPED | OUTPATIENT
Start: 2017-06-14

## 2017-06-14 ASSESSMENT — PAIN SCALES - GENERAL: PAINLEVEL: EXTREME PAIN (8)

## 2017-06-14 NOTE — PROGRESS NOTES
D: 51 year old female seen by Dr. Sepulveda  I: Financial resources  A: SW was contacted by clinic RN to assist patient and family with financial resources. SW met with patient and  in exam room.  Patient and  stated they need financial assistance with gas in order to be able to drive back home to [place]. Patient has [facility] insurance coverage.  Chart review indicated that patient's  works in [place] and was in the state when patient became ill and required hospitalization. According to , he lost his job during this period of time and they currently do not have any money.   indicated inpatient  had set up local lodging for the family following patient's hospital discharge but they will need to vacate the room by tomorrow.  SW encouraged family to contact their friends and family about resource options which  stated they had done but no one was available to assist.  SW also asked about immediate financial assets, inquiring as to whether they would have any cash, checking account, savings account, credit cards. SW informed them that they could charge the gas costs to a credit card and then be reimbursed by their [facility] once they arrived back in their home state.  stated they did not have a credit card or the funds available to pay for the gas upfront. SW also asked if patient and  had tried to contact their local [facility] .   indicated this had not worked for them either. Patient does not have an oncology diagnosis either and would not be able to utilize different karina and transportation programs for oncology patients. SW explained the limited resources available through the clinic and offered to provide gift cards for [employer].  SW explained the limitations of this plan, as [employer] is not guaranteed to be a national company and they may not be able to find a gas station which could accept the gas card as they move  "farther from [place].   indicated \"Well, anything helps. We'll just have to figure it out.\" He accepted offered gas cards ($50).  No other needs indicated at this time.  P: SW is available to assist with any other identified needs, questions or concerns.   "

## 2017-06-14 NOTE — LETTER
Date:Tila 15, 2017      Patient was self referred, no letter generated. Do not send.        [facility] Information

## 2017-06-14 NOTE — LETTER
6/14/2017       RE: Otilia Mckee  [address] [address]     Dear Colleague,    Thank you for referring your patient, Otilia Mckee, to the [facility]. Please see a copy of my visit note below.    THORACIC SURGERY FOLLOW UP VISIT      I saw Ms. Otilia Mckee in follow-up today. 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  Flexible bronchoscopy.   2.  Left neck dissection with drainage of pretracheal phlegmon.     DATE OF PROCEDURE  06/06/2017     COMPLICATIONS  None    STUDIES  Preop CT (6/6/2017): pretracheal air and phlegmon      6/14/17 CT chest/neck: improving mediastinitis, penrose drain in place.      Cultures (6/6/2017): light growth S. epidermidis  ETOH no  TOB recently quit successfully  BMI 35    PMH  DM  CAD s/p recent NSTEMI   5 coronary stents    SUBJECTIVE  Persistent pain, somewhat improved since surgery. No fevers or chills and no stridor. She just needs 5 ml of oxycodone with each dressing change.   Penrose drain removed. No purulence, no malodor, no surrounding erythema. Wound healing appropriately.    From a personal perspective, she is here with her , Bobby. They live in [place], [place], and are struggling financially.    IMPRESSION (J85.3) Mediastinal abscess (H)  (primary encounter diagnosis)    51 year-old female 1 week status post incision and debridement of neck/mediastinal phlegmon.   No clinical evidence of residual tracheal stenosis    PLAN  I reviewed the plan as follows:  1) Follow-up in Thoracic Surgery Clinic with Dr. Rene Fox at [facility].  They had all their questions answered and were in agreement with the plan.  I appreciate the opportunity to participate in the care of your patient and will keep you updated.  Sincerely,      Again, thank you for allowing me to participate in the care of your patient.       Sincerely,    Jacky Sepulveda MD

## 2017-06-14 NOTE — MR AVS SNAPSHOT
"              After Visit Summary   2017    Otilia Mckee    MRN: [id number]           Patient Information     Date Of Birth          1965        Visit Information        Provider Department      2017 12:30 PM Jacky Sepulveda MD [facility]        Today's Diagnoses     Mediastinal abscess (H)    -  1       Follow-ups after your visit        Who to contact     If you have questions or need follow up information about today's clinic visit or your schedule please contact [facility] directly at [phone].  Normal or non-critical lab and imaging results will be communicated to you by IntelGenXhart, letter or phone within 4 business days after the clinic has received the results. If you do not hear from us within 7 days, please contact the clinic through eCollectt or phone. If you have a critical or abnormal lab result, we will notify you by phone as soon as possible.  Submit refill requests through LeadSift or call your pharmacy and they will forward the refill request to us. Please allow 3 business days for your refill to be completed.          Additional Information About Your Visit        MyChart Information     LeadSift lets you send messages to your doctor, view your test results, renew your prescriptions, schedule appointments and more. To sign up, go to [email or web address] . Click on \"Log in\" on the left side of the screen, which will take you to the Welcome page. Then click on \"Sign up Now\" on the right side of the page.     You will be asked to enter the access code listed below, as well as some personal information. Please follow the directions to create your username and password.     Your access code is: [email or web address]  Expires: 2017 12:15 PM     Your access code will  in 90 days. If you need help or a new code, please call your [place] clinic or [phone].        Care EveryWhere ID     This is your Care EveryWhere ID. This could be used " "by other organizations to access your [place] medical records  [id number]        Your Vitals Were     Pulse Temperature Respirations Height Pulse Oximetry BMI (Body Mass Index)    68 98.2  F (36.8  C) (Oral) 18 1.651 m (5' 5\") 96% 35 kg/m2       Blood Pressure from Last 3 Encounters:   06/14/17 131/78   06/11/17 124/70   06/09/17 140/65    Weight from Last 3 Encounters:   06/14/17 95.4 kg (210 lb 4.8 oz)   06/11/17 98.2 kg (216 lb 8 oz)   06/09/17 97.8 kg (215 lb 11.2 oz)              Today, you had the following     No orders found for display         Today's Medication Changes          These changes are accurate as of: 6/14/17  2:49 PM.  If you have any questions, ask your nurse or doctor.               These medicines have changed or have updated prescriptions.        Dose/Directions    * insulin aspart 100 UNIT/ML injection   Commonly known as:  NovoLOG PEN   This may have changed:  Another medication with the same name was removed. Continue taking this medication, and follow the directions you see here.   Used for:  Type 2 diabetes mellitus without complication, with long-term current use of insulin (H)        Dose:  1-9 Units   Inject 1-9 Units Subcutaneous 3 times daily (before meals) Correction Scale Do Not give Correction Insulin if Pre-Meal BG less than 140  1 per 35 >/=140 -174 = 1 unit. -209 = 2 units. -244 = 3 units. -279 = 4 units. -314 = 5 units. -349 = 6 units. -384 = 7 units. -419 = 8 units. BG >/=420 = 9 units.   Quantity:  500 mL   Refills:  0       * insulin aspart 100 UNIT/ML injection   Commonly known as:  NovoLOG PEN   This may have changed:  Another medication with the same name was removed. Continue taking this medication, and follow the directions you see here.   Used for:  Type 2 diabetes mellitus without complication, with long-term current use of insulin (H)        Dose:  1-6 Units   Inject 1-6 Units Subcutaneous At Bedtime   Quantity:  " 200 mL   Refills:  0       * oxyCODONE 5 MG/5ML solution   Commonly known as:  ROXICODONE   This may have changed:  Another medication with the same name was added. Make sure you understand how and when to take each.   Used for:  Soft tissue infection        Dose:  5 mg   Take 5 mLs (5 mg) by mouth 4 times daily as needed for moderate to severe pain   Quantity:  100 mL   Refills:  0       * oxyCODONE 5 MG/5ML solution   Commonly known as:  ROXICODONE   This may have changed:  You were already taking a medication with the same name, and this prescription was added. Make sure you understand how and when to take each.   Used for:  Mediastinal abscess (H)   Changed by:  Jacky Sepulveda MD        Dose:  5 mg   Take 5 mLs (5 mg) by mouth every 4 hours as needed for moderate to severe pain maximum 25 mL per day   Quantity:  150 mL   Refills:  0       * oxyCODONE 5 MG IR tablet   Commonly known as:  ROXICODONE   This may have changed:  You were already taking a medication with the same name, and this prescription was added. Make sure you understand how and when to take each.   Used for:  Mediastinal abscess (H)   Changed by:  Jacky Sepulveda MD        Dose:  5 mg   Take 1 tablet (5 mg) by mouth every 4 hours as needed for pain   Quantity:  30 tablet   Refills:  0       * Notice:  This list has 5 medication(s) that are the same as other medications prescribed for you. Read the directions carefully, and ask your doctor or other care provider to review them with you.         Where to get your medicines      Some of these will need a paper prescription and others can be bought over the counter.  Ask your nurse if you have questions.     Bring a paper prescription for each of these medications     oxyCODONE 5 MG IR tablet    oxyCODONE 5 MG/5ML solution                Primary Care Provider    Physician No Ref-Primary       No address on file        Thank you!     Thank you for choosing [facility]  for your care. Our goal is always to provide you with excellent care. Hearing back from our patients is one way we can continue to improve our services. Please take a few minutes to complete the written survey that you may receive in the mail after your visit with us. Thank you!             Your Updated Medication List - Protect others around you: Learn how to safely use, store and throw away your medicines at www.disposemymeds.org.          This list is accurate as of: 6/14/17  2:49 PM.  Always use your most recent med list.                   Brand Name Dispense Instructions for use    acetaminophen 32 mg/mL solution    TYLENOL    473 mL    Take 20.3 mLs (650 mg) by mouth every 4 hours as needed for mild pain or fever       ASPIRIN PO      Take 81 mg by mouth daily       BUSPIRONE HCL PO      Take 7.5 mg by mouth 2 times daily       clindamycin 300 MG capsule    CLEOCIN    60 capsule    Take 2 capsules (600 mg) by mouth every 8 hours for 10 days       CLOPIDOGREL BISULFATE PO      Take 75 mg by mouth daily       DULOXETINE HCL PO      Take 60 mg by mouth At Bedtime       GABAPENTIN PO      Take 600 mg by mouth 3 times daily       * insulin aspart 100 UNIT/ML injection    NovoLOG PEN    500 mL    Inject 1-9 Units Subcutaneous 3 times daily (before meals) Correction Scale Do Not give Correction Insulin if Pre-Meal BG less than 140  1 per 35 >/=140 -174 = 1 unit. -209 = 2 units. -244 = 3 units. -279 = 4 units. -314 = 5 units. -349 = 6 units. -384 = 7 units. -419 = 8 units. BG >/=420 = 9 units.       * insulin aspart 100 UNIT/ML injection    NovoLOG PEN    200 mL    Inject 1-6 Units Subcutaneous At Bedtime       insulin glargine 100 UNIT/ML injection    LANTUS    6.6 mL    Inject 22 Units Subcutaneous every morning       isosorbide mononitrate 30 MG 24 hr tablet    IMDUR     Take 30 mg by mouth daily       loratadine 10 MG tablet    CLARITIN     Take 10 mg by  mouth daily       metFORMIN 500 MG tablet    GLUCOPHAGE    60 tablet    Take 1 tablet (500 mg) by mouth daily (with dinner)       NITROSTAT SL      Place 0.4 mg under the tongue every 5 minutes as needed for chest pain       * oxyCODONE 5 MG/5ML solution    ROXICODONE    100 mL    Take 5 mLs (5 mg) by mouth 4 times daily as needed for moderate to severe pain       * oxyCODONE 5 MG/5ML solution    ROXICODONE    150 mL    Take 5 mLs (5 mg) by mouth every 4 hours as needed for moderate to severe pain maximum 25 mL per day       * oxyCODONE 5 MG IR tablet    ROXICODONE    30 tablet    Take 1 tablet (5 mg) by mouth every 4 hours as needed for pain       PANTOPRAZOLE SODIUM PO      Take 40 mg by mouth daily       polyethylene glycol Packet    MIRALAX/GLYCOLAX    7 packet    Take 17 g by mouth daily       ROSUVASTATIN CALCIUM PO      Take 20 mg by mouth daily       TOPIRAMATE PO      Take 25 mg by mouth daily       * Notice:  This list has 5 medication(s) that are the same as other medications prescribed for you. Read the directions carefully, and ask your doctor or other care provider to review them with you.

## 2017-06-14 NOTE — PROGRESS NOTES
THORACIC SURGERY FOLLOW UP VISIT      I saw Ms. Otilia Mckee in follow-up today. 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  Flexible bronchoscopy.   2.  Left neck dissection with drainage of pretracheal phlegmon.     DATE OF PROCEDURE  06/06/2017     COMPLICATIONS  None    STUDIES  Preop CT (6/6/2017): pretracheal air and phlegmon      6/14/17 CT chest/neck: improving mediastinitis, penrose drain in place.      Cultures (6/6/2017): light growth S. epidermidis  ETOH no  TOB recently quit successfully  BMI 35    PMH  DM  CAD s/p recent NSTEMI   5 coronary stents    SUBJECTIVE  Persistent pain, somewhat improved since surgery. No fevers or chills and no stridor. She just needs 5 ml of oxycodone with each dressing change.   Penrose drain removed. No purulence, no malodor, no surrounding erythema. Wound healing appropriately.    From a personal perspective, she is here with her , Bobby. They live in [place], [place], and are struggling financially.    IMPRESSION (J85.3) Mediastinal abscess (H)  (primary encounter diagnosis)    51 year-old female 1 week status post incision and debridement of neck/mediastinal phlegmon.   No clinical evidence of residual tracheal stenosis    PLAN  I reviewed the plan as follows:  1) Follow-up in Thoracic Surgery Clinic with Dr. Rene Fox at [facility].  They had all their questions answered and were in agreement with the plan.  I appreciate the opportunity to participate in the care of your patient and will keep you updated.  Sincerely,

## 2017-06-14 NOTE — NURSING NOTE
"Oncology Rooming Note    June 14, 2017 9:34 AM   Otilia Mckee is a 50 year old female who presents for: Oncology Clinic Visit    Initial Vitals: /78  Pulse 68  Temp 98.2  F (36.8  C) (Oral)  Resp 18  Ht 1.651 m (5' 5\")  Wt 95.4 kg (210 lb 4.8 oz)  SpO2 96%  BMI 35 kg/m2 Estimated body mass index is 35 kg/(m^2) as calculated from the following:    Height as of this encounter: 1.651 m (5' 5\").    Weight as of this encounter: 95.4 kg (210 lb 4.8 oz). Body surface area is 2.09 meters squared.  Extreme Pain (8) Comment: Data Unavailable   No LMP recorded. Patient has had a hysterectomy.  Allergies reviewed: Yes  Medications reviewed: Yes    Medications: pt wants mediations refilled   Pharmacy name entered into Cod[facility]: [facility] - [place], [place] - [address] [place] [address]    Clinical concerns:pain level of 8 in throat .  I informed pt to remind the Provider/ANGEL about pain level in case i dont touch bases with them, if the provider was in the exam room while i attend on rooming the next pt. Pt verbalized understandings.  Eliza Barragan CMA      6 minutes for nursing intake (face to face time)     Eliza Barragan CMA                              "

## 2017-06-14 NOTE — MR AVS SNAPSHOT
"              After Visit Summary   2017    Otilia Mckee    MRN: [id number]           Patient Information     Date Of Birth          1965        Visit Information        Provider Department      2017 3:40 PM Han, Soo Yeon, MSW [facility]        Today's Diagnoses     Visit for counseling    -  1       Follow-ups after your visit        Who to contact     If you have questions or need follow up information about today's clinic visit or your schedule please contact [facility] directly at [phone].  Normal or non-critical lab and imaging results will be communicated to you by TimeFree Innovationshart, letter or phone within 4 business days after the clinic has received the results. If you do not hear from us within 7 days, please contact the clinic through TimeFree Innovationshart or phone. If you have a critical or abnormal lab result, we will notify you by phone as soon as possible.  Submit refill requests through Current Media or call your pharmacy and they will forward the refill request to us. Please allow 3 business days for your refill to be completed.          Additional Information About Your Visit        MyChart Information     Current Media lets you send messages to your doctor, view your test results, renew your prescriptions, schedule appointments and more. To sign up, go to [email or web address] . Click on \"Log in\" on the left side of the screen, which will take you to the Welcome page. Then click on \"Sign up Now\" on the right side of the page.     You will be asked to enter the access code listed below, as well as some personal information. Please follow the directions to create your username and password.     Your access code is: [email or web address]  Expires: 2017 12:15 PM     Your access code will  in 90 days. If you need help or a new code, please call your [facility] or [phone].        Care EveryWhere ID     This is your Care EveryWhere ID. This could be used by other " organizations to access your [place] medical records  [id number]         Blood Pressure from Last 3 Encounters:   06/14/17 131/78   06/11/17 124/70   06/09/17 140/65    Weight from Last 3 Encounters:   06/14/17 95.4 kg (210 lb 4.8 oz)   06/11/17 98.2 kg (216 lb 8 oz)   06/09/17 97.8 kg (215 lb 11.2 oz)              Today, you had the following     No orders found for display         Today's Medication Changes          These changes are accurate as of: 6/14/17  4:02 PM.  If you have any questions, ask your nurse or doctor.               These medicines have changed or have updated prescriptions.        Dose/Directions    * insulin aspart 100 UNIT/ML injection   Commonly known as:  NovoLOG PEN   This may have changed:  Another medication with the same name was removed. Continue taking this medication, and follow the directions you see here.   Used for:  Type 2 diabetes mellitus without complication, with long-term current use of insulin (H)        Dose:  1-9 Units   Inject 1-9 Units Subcutaneous 3 times daily (before meals) Correction Scale Do Not give Correction Insulin if Pre-Meal BG less than 140  1 per 35 >/=140 -174 = 1 unit. -209 = 2 units. -244 = 3 units. -279 = 4 units. -314 = 5 units. -349 = 6 units. -384 = 7 units. -419 = 8 units. BG >/=420 = 9 units.   Quantity:  500 mL   Refills:  0       * insulin aspart 100 UNIT/ML injection   Commonly known as:  NovoLOG PEN   This may have changed:  Another medication with the same name was removed. Continue taking this medication, and follow the directions you see here.   Used for:  Type 2 diabetes mellitus without complication, with long-term current use of insulin (H)        Dose:  1-6 Units   Inject 1-6 Units Subcutaneous At Bedtime   Quantity:  200 mL   Refills:  0       * oxyCODONE 5 MG/5ML solution   Commonly known as:  ROXICODONE   This may have changed:  Another medication with the same name was added. Make  sure you understand how and when to take each.   Used for:  Soft tissue infection        Dose:  5 mg   Take 5 mLs (5 mg) by mouth 4 times daily as needed for moderate to severe pain   Quantity:  100 mL   Refills:  0       * oxyCODONE 5 MG/5ML solution   Commonly known as:  ROXICODONE   This may have changed:  You were already taking a medication with the same name, and this prescription was added. Make sure you understand how and when to take each.   Used for:  Mediastinal abscess (H)   Changed by:  Jacky Sepulveda MD        Dose:  5 mg   Take 5 mLs (5 mg) by mouth every 4 hours as needed for moderate to severe pain maximum 25 mL per day   Quantity:  150 mL   Refills:  0       * oxyCODONE 5 MG IR tablet   Commonly known as:  ROXICODONE   This may have changed:  You were already taking a medication with the same name, and this prescription was added. Make sure you understand how and when to take each.   Used for:  Mediastinal abscess (H)   Changed by:  Jacky Sepulveda MD        Dose:  5 mg   Take 1 tablet (5 mg) by mouth every 4 hours as needed for pain   Quantity:  30 tablet   Refills:  0       * oxyCODONE 5 MG capsule   Commonly known as:  OXY-IR   This may have changed:  You were already taking a medication with the same name, and this prescription was added. Make sure you understand how and when to take each.   Used for:  Mediastinal abscess (H)   Changed by:  Jacky Sepulveda MD        Dose:  5 mg   Take 1 capsule (5 mg) by mouth every 4 hours as needed for moderate to severe pain   Quantity:  30 capsule   Refills:  0       * Notice:  This list has 6 medication(s) that are the same as other medications prescribed for you. Read the directions carefully, and ask your doctor or other care provider to review them with you.         Where to get your medicines      Some of these will need a paper prescription and others can be bought over the counter.  Ask your nurse if you have questions.      Bring a paper prescription for each of these medications     oxyCODONE 5 MG capsule    oxyCODONE 5 MG IR tablet    oxyCODONE 5 MG/5ML solution                Primary Care Provider    Physician No Ref-Primary       No address on file        Thank you!     Thank you for choosing [facility]  for your care. Our goal is always to provide you with excellent care. Hearing back from our patients is one way we can continue to improve our services. Please take a few minutes to complete the written survey that you may receive in the mail after your visit with us. Thank you!             Your Updated Medication List - Protect others around you: Learn how to safely use, store and throw away your medicines at www.disposemymeds.org.          This list is accurate as of: 6/14/17  4:02 PM.  Always use your most recent med list.                   Brand Name Dispense Instructions for use    acetaminophen 32 mg/mL solution    TYLENOL    473 mL    Take 20.3 mLs (650 mg) by mouth every 4 hours as needed for mild pain or fever       ASPIRIN PO      Take 81 mg by mouth daily       BUSPIRONE HCL PO      Take 7.5 mg by mouth 2 times daily       clindamycin 300 MG capsule    CLEOCIN    60 capsule    Take 2 capsules (600 mg) by mouth every 8 hours for 10 days       CLOPIDOGREL BISULFATE PO      Take 75 mg by mouth daily       DULOXETINE HCL PO      Take 60 mg by mouth At Bedtime       GABAPENTIN PO      Take 600 mg by mouth 3 times daily       * insulin aspart 100 UNIT/ML injection    NovoLOG PEN    500 mL    Inject 1-9 Units Subcutaneous 3 times daily (before meals) Correction Scale Do Not give Correction Insulin if Pre-Meal BG less than 140  1 per 35 >/=140 -174 = 1 unit. -209 = 2 units. -244 = 3 units. -279 = 4 units. -314 = 5 units. -349 = 6 units. -384 = 7 units. -419 = 8 units. BG >/=420 = 9 units.       * insulin aspart 100 UNIT/ML injection    NovoLOG PEN    200 mL     Inject 1-6 Units Subcutaneous At Bedtime       insulin glargine 100 UNIT/ML injection    LANTUS    6.6 mL    Inject 22 Units Subcutaneous every morning       isosorbide mononitrate 30 MG 24 hr tablet    IMDUR     Take 30 mg by mouth daily       loratadine 10 MG tablet    CLARITIN     Take 10 mg by mouth daily       metFORMIN 500 MG tablet    GLUCOPHAGE    60 tablet    Take 1 tablet (500 mg) by mouth daily (with dinner)       NITROSTAT SL      Place 0.4 mg under the tongue every 5 minutes as needed for chest pain       * oxyCODONE 5 MG/5ML solution    ROXICODONE    100 mL    Take 5 mLs (5 mg) by mouth 4 times daily as needed for moderate to severe pain       * oxyCODONE 5 MG/5ML solution    ROXICODONE    150 mL    Take 5 mLs (5 mg) by mouth every 4 hours as needed for moderate to severe pain maximum 25 mL per day       * oxyCODONE 5 MG IR tablet    ROXICODONE    30 tablet    Take 1 tablet (5 mg) by mouth every 4 hours as needed for pain       * oxyCODONE 5 MG capsule    OXY-IR    30 capsule    Take 1 capsule (5 mg) by mouth every 4 hours as needed for moderate to severe pain       PANTOPRAZOLE SODIUM PO      Take 40 mg by mouth daily       polyethylene glycol Packet    MIRALAX/GLYCOLAX    7 packet    Take 17 g by mouth daily       ROSUVASTATIN CALCIUM PO      Take 20 mg by mouth daily       TOPIRAMATE PO      Take 25 mg by mouth daily       * Notice:  This list has 6 medication(s) that are the same as other medications prescribed for you. Read the directions carefully, and ask your doctor or other care provider to review them with you.

## 2017-07-05 LAB
FUNGUS SPEC CULT: NORMAL
MICRO REPORT STATUS: NORMAL
SPECIMEN SOURCE: NORMAL

## (undated) DEVICE — SU SILK 0 TIE 6X30" A306H

## (undated) DEVICE — DRAPE IOBAN INCISE 23X17" 6650EZ

## (undated) DEVICE — LINEN TOWEL PACK X6 WHITE 5487

## (undated) DEVICE — KIT ENDO FIRST STEP DISINFECTANT 200ML W/POUCH EP-4

## (undated) DEVICE — SUCTION MANIFOLD DORNOCH ULTRA CART UL-CL500

## (undated) DEVICE — ENDO VALVE BX EVIS MAJ-210

## (undated) DEVICE — ENDO VALVE SUCTION ULTRASOUND BRONCH MAJ-1414

## (undated) DEVICE — DRAPE SHEET REV FOLD 3/4 9349

## (undated) DEVICE — GLOVE PROTEXIS POWDER FREE 8.0 ORTHOPEDIC 2D73ET80

## (undated) DEVICE — SPONGE KITTNER 30-101

## (undated) DEVICE — DRSG KERLIX 2 1/4"X3YDS ROLL 6720

## (undated) DEVICE — DRSG GAUZE 4X4" TRAY 6939

## (undated) DEVICE — ENDO VALVE SUCTION BRONCH EVIS MAJ-209

## (undated) DEVICE — ESU GROUND PAD ADULT W/CORD E7507

## (undated) DEVICE — PREP CHLORAPREP 26ML TINTED ORANGE  260815

## (undated) DEVICE — DRSG ABDOMINAL 07 1/2X8" 7197D

## (undated) DEVICE — SU UMBILICAL TAPE .125X30" U11T

## (undated) DEVICE — SU MONOCRYL 4-0 RB-1 27" Y214H

## (undated) DEVICE — SU SILK 0 SH 30" K834H

## (undated) DEVICE — SU VICRYL 2-0 SH 27" UND J417H

## (undated) DEVICE — SYR 30ML SLIP TIP W/O NDL 302833

## (undated) DEVICE — LINEN GOWN XLG 5407

## (undated) DEVICE — Device

## (undated) RX ORDER — ONDANSETRON 2 MG/ML
INJECTION INTRAMUSCULAR; INTRAVENOUS
Status: DISPENSED
Start: 2017-06-06

## (undated) RX ORDER — DEXAMETHASONE SODIUM PHOSPHATE 4 MG/ML
INJECTION, SOLUTION INTRA-ARTICULAR; INTRALESIONAL; INTRAMUSCULAR; INTRAVENOUS; SOFT TISSUE
Status: DISPENSED
Start: 2017-06-06

## (undated) RX ORDER — PHENYLEPHRINE HCL IN 0.9% NACL 1 MG/10 ML
SYRINGE (ML) INTRAVENOUS
Status: DISPENSED
Start: 2017-06-06

## (undated) RX ORDER — ESMOLOL HYDROCHLORIDE 10 MG/ML
INJECTION INTRAVENOUS
Status: DISPENSED
Start: 2017-06-06

## (undated) RX ORDER — HYDROMORPHONE HYDROCHLORIDE 1 MG/ML
INJECTION, SOLUTION INTRAMUSCULAR; INTRAVENOUS; SUBCUTANEOUS
Status: DISPENSED
Start: 2017-06-07

## (undated) RX ORDER — FENTANYL CITRATE 50 UG/ML
INJECTION, SOLUTION INTRAMUSCULAR; INTRAVENOUS
Status: DISPENSED
Start: 2017-06-06

## (undated) RX ORDER — EPHEDRINE SULFATE 50 MG/ML
INJECTION, SOLUTION INTRAMUSCULAR; INTRAVENOUS; SUBCUTANEOUS
Status: DISPENSED
Start: 2017-06-06

## (undated) RX ORDER — LIDOCAINE HYDROCHLORIDE 20 MG/ML
INJECTION, SOLUTION EPIDURAL; INFILTRATION; INTRACAUDAL; PERINEURAL
Status: DISPENSED
Start: 2017-06-06

## (undated) RX ORDER — METOPROLOL TARTRATE 1 MG/ML
INJECTION, SOLUTION INTRAVENOUS
Status: DISPENSED
Start: 2017-06-07

## (undated) RX ORDER — LABETALOL HYDROCHLORIDE 5 MG/ML
INJECTION, SOLUTION INTRAVENOUS
Status: DISPENSED
Start: 2017-06-07

## (undated) RX ORDER — PROPOFOL 10 MG/ML
INJECTION, EMULSION INTRAVENOUS
Status: DISPENSED
Start: 2017-06-06

## (undated) RX ORDER — FENTANYL CITRATE 50 UG/ML
INJECTION, SOLUTION INTRAMUSCULAR; INTRAVENOUS
Status: DISPENSED
Start: 2017-06-07